# Patient Record
Sex: FEMALE | Race: BLACK OR AFRICAN AMERICAN | Employment: UNEMPLOYED | URBAN - METROPOLITAN AREA
[De-identification: names, ages, dates, MRNs, and addresses within clinical notes are randomized per-mention and may not be internally consistent; named-entity substitution may affect disease eponyms.]

---

## 2018-09-28 ENCOUNTER — HOSPITAL ENCOUNTER (OUTPATIENT)
Dept: LAB | Age: 48
Discharge: HOME | End: 2018-09-28
Payer: OTHER GOVERNMENT

## 2018-09-28 ENCOUNTER — OFFICE VISIT (OUTPATIENT)
Dept: FAMILY MEDICINE CLINIC | Age: 48
End: 2018-09-28

## 2018-09-28 VITALS
HEART RATE: 96 BPM | SYSTOLIC BLOOD PRESSURE: 153 MMHG | TEMPERATURE: 99 F | DIASTOLIC BLOOD PRESSURE: 86 MMHG | HEIGHT: 60 IN | RESPIRATION RATE: 18 BRPM | BODY MASS INDEX: 39.85 KG/M2 | WEIGHT: 203 LBS | OXYGEN SATURATION: 100 %

## 2018-09-28 DIAGNOSIS — R14.2 BELCHING: ICD-10-CM

## 2018-09-28 DIAGNOSIS — R10.13 EPIGASTRIC PAIN: Primary | ICD-10-CM

## 2018-09-28 DIAGNOSIS — R53.81 MALAISE: ICD-10-CM

## 2018-09-28 DIAGNOSIS — R12 HEARTBURN: ICD-10-CM

## 2018-09-28 DIAGNOSIS — R11.0 NAUSEA: ICD-10-CM

## 2018-09-28 DIAGNOSIS — R10.13 EPIGASTRIC PAIN: ICD-10-CM

## 2018-09-28 DIAGNOSIS — R19.7 DIARRHEA, UNSPECIFIED TYPE: ICD-10-CM

## 2018-09-28 DIAGNOSIS — R14.0 ABDOMINAL BLOATING: ICD-10-CM

## 2018-09-28 PROBLEM — E66.01 SEVERE OBESITY (BMI 35.0-39.9): Status: ACTIVE | Noted: 2018-09-28

## 2018-09-28 LAB
ALBUMIN SERPL-MCNC: 3.8 G/DL (ref 3.4–5)
ALBUMIN/GLOB SERPL: 0.9 {RATIO} (ref 0.8–1.7)
ALP SERPL-CCNC: 101 U/L (ref 45–117)
ALT SERPL-CCNC: 22 U/L (ref 13–56)
AMYLASE SERPL-CCNC: 71 U/L (ref 25–115)
ANION GAP SERPL CALC-SCNC: 9 MMOL/L (ref 3–18)
AST SERPL-CCNC: 19 U/L (ref 15–37)
BASOPHILS # BLD: 0 K/UL (ref 0–0.1)
BASOPHILS NFR BLD: 0 % (ref 0–2)
BILIRUB SERPL-MCNC: 0.4 MG/DL (ref 0.2–1)
BUN SERPL-MCNC: 10 MG/DL (ref 7–18)
BUN/CREAT SERPL: 9 (ref 12–20)
CALCIUM SERPL-MCNC: 9.1 MG/DL (ref 8.5–10.1)
CHLORIDE SERPL-SCNC: 105 MMOL/L (ref 100–108)
CO2 SERPL-SCNC: 28 MMOL/L (ref 21–32)
CREAT SERPL-MCNC: 1.15 MG/DL (ref 0.6–1.3)
DIFFERENTIAL METHOD BLD: ABNORMAL
EOSINOPHIL # BLD: 0 K/UL (ref 0–0.4)
EOSINOPHIL NFR BLD: 0 % (ref 0–5)
ERYTHROCYTE [DISTWIDTH] IN BLOOD BY AUTOMATED COUNT: 14 % (ref 11.6–14.5)
GLOBULIN SER CALC-MCNC: 4.1 G/DL (ref 2–4)
GLUCOSE SERPL-MCNC: 102 MG/DL (ref 74–99)
HCT VFR BLD AUTO: 39.6 % (ref 35–45)
HGB BLD-MCNC: 12.1 G/DL (ref 12–16)
LIPASE SERPL-CCNC: 391 U/L (ref 73–393)
LYMPHOCYTES # BLD: 1.6 K/UL (ref 0.9–3.6)
LYMPHOCYTES NFR BLD: 34 % (ref 21–52)
MCH RBC QN AUTO: 25.6 PG (ref 24–34)
MCHC RBC AUTO-ENTMCNC: 30.6 G/DL (ref 31–37)
MCV RBC AUTO: 83.7 FL (ref 74–97)
MONOCYTES # BLD: 0.5 K/UL (ref 0.05–1.2)
MONOCYTES NFR BLD: 10 % (ref 3–10)
NEUTS SEG # BLD: 2.6 K/UL (ref 1.8–8)
NEUTS SEG NFR BLD: 56 % (ref 40–73)
PLATELET # BLD AUTO: 288 K/UL (ref 135–420)
PMV BLD AUTO: 11.4 FL (ref 9.2–11.8)
POTASSIUM SERPL-SCNC: 4.4 MMOL/L (ref 3.5–5.5)
PROT SERPL-MCNC: 7.9 G/DL (ref 6.4–8.2)
RBC # BLD AUTO: 4.73 M/UL (ref 4.2–5.3)
SODIUM SERPL-SCNC: 142 MMOL/L (ref 136–145)
WBC # BLD AUTO: 4.7 K/UL (ref 4.6–13.2)

## 2018-09-28 PROCEDURE — 85025 COMPLETE CBC W/AUTO DIFF WBC: CPT | Performed by: PHYSICIAN ASSISTANT

## 2018-09-28 PROCEDURE — 82150 ASSAY OF AMYLASE: CPT | Performed by: PHYSICIAN ASSISTANT

## 2018-09-28 PROCEDURE — 83013 H PYLORI (C-13) BREATH: CPT | Performed by: PHYSICIAN ASSISTANT

## 2018-09-28 PROCEDURE — 83690 ASSAY OF LIPASE: CPT | Performed by: PHYSICIAN ASSISTANT

## 2018-09-28 PROCEDURE — 80053 COMPREHEN METABOLIC PANEL: CPT | Performed by: PHYSICIAN ASSISTANT

## 2018-09-28 NOTE — PROGRESS NOTES
Zechariah Costa is a 51 y.o. female c/o upper stomach pain with with fatique and chills, had diarrhea for a couple of days.

## 2018-09-28 NOTE — PATIENT INSTRUCTIONS
*Start taking Omeprazole 20mg daily. Indigestion (Dyspepsia or Heartburn): Care Instructions  Your Care Instructions  Sometimes it can be hard to pinpoint the cause of indigestion. (It is also called dyspepsia or heartburn.) Most cases of an upset stomach with bloating, burning, burping, and nausea are minor and go away within several hours. Home treatment and over-the-counter medicine often are able to control symptoms. But if you take medicine to relieve your indigestion without making diet and lifestyle changes, your symptoms are likely to return again and again. If you get indigestion often, it may be a sign of a more serious medical problem. Be sure to follow up with your doctor, who may want to do tests to be sure of the cause of your indigestion. Follow-up care is a key part of your treatment and safety. Be sure to make and go to all appointments, and call your doctor if you are having problems. It's also a good idea to know your test results and keep a list of the medicines you take. How can you care for yourself at home? · Your doctor may recommend over-the-counter medicine. For mild or occasional indigestion, antacids such as Gaviscon, Mylanta, Maalox, or Tums, may help. Be safe with medicines. Be careful when you take over-the-counter antacid medicines. Many of these medicines have aspirin in them. Read the label to make sure that you are not taking more than the recommended dose. Too much aspirin can be harmful. · Your doctor also may recommend over-the-counter acid reducers, such as Pepcid AC, Tagamet HB, Zantac 75, or Prilosec. Read and follow all instructions on the label. If you use these medicines often, talk with your doctor. · Change your eating habits. ¨ It's best to eat several small meals instead of two or three large meals. ¨ After you eat, wait 2 to 3 hours before you lie down. ¨ Chocolate, mint, and alcohol can make GERD worse.   ¨ Spicy foods, foods that have a lot of acid (like tomatoes and oranges), and coffee can make GERD symptoms worse in some people. If your symptoms are worse after you eat a certain food, you may want to stop eating that food to see if your symptoms get better. · Do not smoke or chew tobacco. Smoking can make GERD worse. If you need help quitting, talk to your doctor about stop-smoking programs and medicines. These can increase your chances of quitting for good. · If you have GERD symptoms at night, raise the head of your bed 6 to 8 inches. You can do this by putting the frame on blocks or placing a foam wedge under the head of your mattress. (Adding extra pillows does not work.)  · Do not wear tight clothing around your middle. · Lose weight if you need to. Losing just 5 to 10 pounds can help. · Do not take anti-inflammatory medicines, such as aspirin, ibuprofen (Advil, Motrin), or naproxen (Aleve). These can irritate the stomach. If you need a pain medicine, try acetaminophen (Tylenol), which does not cause stomach upset. When should you call for help? Call your doctor now or seek immediate medical care if:    · You have new or worse belly pain.     · You are vomiting.    Watch closely for changes in your health, and be sure to contact your doctor if:    · You have new or worse symptoms of indigestion.     · You have trouble or pain swallowing.     · You are losing weight.     · You do not get better as expected. Where can you learn more? Go to http://mikey-sarah beth.info/. Enter F814 in the search box to learn more about \"Indigestion (Dyspepsia or Heartburn): Care Instructions. \"  Current as of: May 12, 2017  Content Version: 11.7  © 8948-3920 [employer]. Care instructions adapted under license by [employer] (which disclaims liability or warranty for this information).  If you have questions about a medical condition or this instruction, always ask your healthcare professional. Melinda Dunlap disclaims any warranty or liability for your use of this information. Abdominal Pain: Care Instructions  Your Care Instructions    Abdominal pain has many possible causes. Some aren't serious and get better on their own in a few days. Others need more testing and treatment. If your pain continues or gets worse, you need to be rechecked and may need more tests to find out what is wrong. You may need surgery to correct the problem. Don't ignore new symptoms, such as fever, nausea and vomiting, urination problems, pain that gets worse, and dizziness. These may be signs of a more serious problem. Your doctor may have recommended a follow-up visit in the next 8 to 12 hours. If you are not getting better, you may need more tests or treatment. The doctor has checked you carefully, but problems can develop later. If you notice any problems or new symptoms, get medical treatment right away. Follow-up care is a key part of your treatment and safety. Be sure to make and go to all appointments, and call your doctor if you are having problems. It's also a good idea to know your test results and keep a list of the medicines you take. How can you care for yourself at home? · Rest until you feel better. · To prevent dehydration, drink plenty of fluids, enough so that your urine is light yellow or clear like water. Choose water and other caffeine-free clear liquids until you feel better. If you have kidney, heart, or liver disease and have to limit fluids, talk with your doctor before you increase the amount of fluids you drink. · If your stomach is upset, eat mild foods, such as rice, dry toast or crackers, bananas, and applesauce. Try eating several small meals instead of two or three large ones. · Wait until 48 hours after all symptoms have gone away before you have spicy foods, alcohol, and drinks that contain caffeine. · Do not eat foods that are high in fat.   · Avoid anti-inflammatory medicines such as aspirin, ibuprofen (Advil, Motrin), and naproxen (Aleve). These can cause stomach upset. Talk to your doctor if you take daily aspirin for another health problem. When should you call for help? Call 911 anytime you think you may need emergency care. For example, call if:    · You passed out (lost consciousness).     · You pass maroon or very bloody stools.     · You vomit blood or what looks like coffee grounds.     · You have new, severe belly pain.    Call your doctor now or seek immediate medical care if:    · Your pain gets worse, especially if it becomes focused in one area of your belly.     · You have a new or higher fever.     · Your stools are black and look like tar, or they have streaks of blood.     · You have unexpected vaginal bleeding.     · You have symptoms of a urinary tract infection. These may include:  ¨ Pain when you urinate. ¨ Urinating more often than usual.  ¨ Blood in your urine.     · You are dizzy or lightheaded, or you feel like you may faint.    Watch closely for changes in your health, and be sure to contact your doctor if:    · You are not getting better after 1 day (24 hours). Where can you learn more? Go to http://mikey-sarah beth.info/. Enter M949 in the search box to learn more about \"Abdominal Pain: Care Instructions. \"  Current as of: November 20, 2017  Content Version: 11.7  © 0920-8626 [employer]. Care instructions adapted under license by [employer] (which disclaims liability or warranty for this information). If you have questions about a medical condition or this instruction, always ask your healthcare professional. [address] any warranty or liability for your use of this information.

## 2018-09-28 NOTE — MR AVS SNAPSHOT
[address] [place] [address] [address] [address] 
[phone] Patient: Chen Niño MRN: [id number] :1970 Visit Information Date & Time Provider Department Dept. Phone Encounter #  
 2018 11:30 AM Luanne Rosenberg PA-C [address] [place] [phone] [id number] Follow-up Instructions Return in about 3 weeks (around 10/19/2018). Upcoming Health Maintenance Date Due DTaP/Tdap/Td series (1 - Tdap) 1990 PAP AKA CERVICAL CYTOLOGY 2014 Influenza Age 5 to Adult 2018 Allergies as of 2018  Review Complete On: 2018 By: Luanne Rosenberg PA-C Severity Noted Reaction Type Reactions Vicodin [Hydrocodone-acetaminophen]  10/07/2013    Nausea and Vomiting Current Immunizations  Never Reviewed No immunizations on file. Not reviewed this visit You Were Diagnosed With   
  
 Codes Comments Epigastric pain    -  Primary ICD-10-CM: R10.13 ICD-9-CM: 789.06 Abdominal bloating     ICD-10-CM: R14.0 ICD-9-CM: 787.3 Belching     ICD-10-CM: R14.2 ICD-9-CM: 787.3 Heartburn     ICD-10-CM: R12 
ICD-9-CM: 787.1 Malaise     ICD-10-CM: R53.81 ICD-9-CM: 780.79 Diarrhea, unspecified type     ICD-10-CM: R19.7 ICD-9-CM: 787.91 Nausea     ICD-10-CM: R11.0 ICD-9-CM: 787.02 Vitals BP Pulse Temp Resp Height(growth percentile) Weight(growth percentile) 153/86 (BP 1 Location: Left arm, BP Patient Position: Sitting) 96 99 °F (37.2 °C) (Oral) 18 5' (1.524 m) 203 lb (92.1 kg) SpO2 BMI OB Status Smoking Status 100% 39.65 kg/m2 Hysterectomy Never Smoker Vitals History BMI and BSA Data Body Mass Index Body Surface Area  
 39.65 kg/m 2 1.97 m 2 Preferred Pharmacy Pharmacy Name Phone [address] [place] [phone] Your Updated Medication List  
  
   
This list is accurate as of 9/28/18 12:13 PM.  Always use your most recent med list.  
  
  
  
  
 MICARDIS 40 mg tablet Generic drug:  telmisartan Take 40 mg by mouth daily. Follow-up Instructions Return in about 3 weeks (around 10/19/2018). To-Do List   
 09/28/2018 Lab:  AMYLASE   
  
 09/28/2018 Lab:  CBC WITH AUTOMATED DIFF   
  
 09/28/2018 Lab:  H. PYLORI BREATH TEST   
  
 09/28/2018 Lab:  LIPASE   
  
 09/28/2018 Lab:  METABOLIC PANEL, COMPREHENSIVE   
  
 09/28/2018 Imaging:  US ABD COMP Patient Instructions *Start taking Omeprazole 20mg daily. Indigestion (Dyspepsia or Heartburn): Care Instructions Your Care Instructions Sometimes it can be hard to pinpoint the cause of indigestion. (It is also called dyspepsia or heartburn.) Most cases of an upset stomach with bloating, burning, burping, and nausea are minor and go away within several hours. Home treatment and over-the-counter medicine often are able to control symptoms. But if you take medicine to relieve your indigestion without making diet and lifestyle changes, your symptoms are likely to return again and again. If you get indigestion often, it may be a sign of a more serious medical problem. Be sure to follow up with your doctor, who may want to do tests to be sure of the cause of your indigestion. Follow-up care is a key part of your treatment and safety. Be sure to make and go to all appointments, and call your doctor if you are having problems. It's also a good idea to know your test results and keep a list of the medicines you take. How can you care for yourself at home? · Your doctor may recommend over-the-counter medicine. For mild or occasional indigestion, antacids such as Gaviscon, Mylanta, Maalox, or Tums, may help. Be safe with medicines.  Be careful when you take over-the-counter antacid medicines. Many of these medicines have aspirin in them. Read the label to make sure that you are not taking more than the recommended dose. Too much aspirin can be harmful. · Your doctor also may recommend over-the-counter acid reducers, such as Pepcid AC, Tagamet HB, Zantac 75, or Prilosec. Read and follow all instructions on the label. If you use these medicines often, talk with your doctor. · Change your eating habits. ¨ It's best to eat several small meals instead of two or three large meals. ¨ After you eat, wait 2 to 3 hours before you lie down. ¨ Chocolate, mint, and alcohol can make GERD worse. ¨ Spicy foods, foods that have a lot of acid (like tomatoes and oranges), and coffee can make GERD symptoms worse in some people. If your symptoms are worse after you eat a certain food, you may want to stop eating that food to see if your symptoms get better. · Do not smoke or chew tobacco. Smoking can make GERD worse. If you need help quitting, talk to your doctor about stop-smoking programs and medicines. These can increase your chances of quitting for good. · If you have GERD symptoms at night, raise the head of your bed 6 to 8 inches. You can do this by putting the frame on blocks or placing a foam wedge under the head of your mattress. (Adding extra pillows does not work.) · Do not wear tight clothing around your middle. · Lose weight if you need to. Losing just 5 to 10 pounds can help. · Do not take anti-inflammatory medicines, such as aspirin, ibuprofen (Advil, Motrin), or naproxen (Aleve). These can irritate the stomach. If you need a pain medicine, try acetaminophen (Tylenol), which does not cause stomach upset. When should you call for help? Call your doctor now or seek immediate medical care if: 
  · You have new or worse belly pain.  
  · You are vomiting.  
 Watch closely for changes in your health, and be sure to contact your doctor if:   · You have new or worse symptoms of indigestion.  
  · You have trouble or pain swallowing.  
  · You are losing weight.  
  · You do not get better as expected. Where can you learn more? Go to http://mikey-sarah beth.info/. Enter O442 in the search box to learn more about \"Indigestion (Dyspepsia or Heartburn): Care Instructions. \" Current as of: May 12, 2017 Content Version: 11.7 © 3949-1603 [employer]. Care instructions adapted under license by [employer] (which disclaims liability or warranty for this information). If you have questions about a medical condition or this instruction, always ask your healthcare professional. [address] any warranty or liability for your use of this information. Abdominal Pain: Care Instructions Your Care Instructions Abdominal pain has many possible causes. Some aren't serious and get better on their own in a few days. Others need more testing and treatment. If your pain continues or gets worse, you need to be rechecked and may need more tests to find out what is wrong. You may need surgery to correct the problem. Don't ignore new symptoms, such as fever, nausea and vomiting, urination problems, pain that gets worse, and dizziness. These may be signs of a more serious problem. Your doctor may have recommended a follow-up visit in the next 8 to 12 hours. If you are not getting better, you may need more tests or treatment. The doctor has checked you carefully, but problems can develop later. If you notice any problems or new symptoms, get medical treatment right away. Follow-up care is a key part of your treatment and safety. Be sure to make and go to all appointments, and call your doctor if you are having problems. It's also a good idea to know your test results and keep a list of the medicines you take. How can you care for yourself at home? · Rest until you feel better. · To prevent dehydration, drink plenty of fluids, enough so that your urine is light yellow or clear like water. Choose water and other caffeine-free clear liquids until you feel better. If you have kidney, heart, or liver disease and have to limit fluids, talk with your doctor before you increase the amount of fluids you drink. · If your stomach is upset, eat mild foods, such as rice, dry toast or crackers, bananas, and applesauce. Try eating several small meals instead of two or three large ones. · Wait until 48 hours after all symptoms have gone away before you have spicy foods, alcohol, and drinks that contain caffeine. · Do not eat foods that are high in fat. · Avoid anti-inflammatory medicines such as aspirin, ibuprofen (Advil, Motrin), and naproxen (Aleve). These can cause stomach upset. Talk to your doctor if you take daily aspirin for another health problem. When should you call for help? Call 911 anytime you think you may need emergency care. For example, call if: 
  · You passed out (lost consciousness).  
  · You pass maroon or very bloody stools.  
  · You vomit blood or what looks like coffee grounds.  
  · You have new, severe belly pain.  
 Call your doctor now or seek immediate medical care if: 
  · Your pain gets worse, especially if it becomes focused in one area of your belly.  
  · You have a new or higher fever.  
  · Your stools are black and look like tar, or they have streaks of blood.  
  · You have unexpected vaginal bleeding.  
  · You have symptoms of a urinary tract infection. These may include: 
¨ Pain when you urinate. ¨ Urinating more often than usual. 
¨ Blood in your urine.  
  · You are dizzy or lightheaded, or you feel like you may faint.  
 Watch closely for changes in your health, and be sure to contact your doctor if: 
  · You are not getting better after 1 day (24 hours). Where can you learn more? Go to http://mikey-sarah beth.info/. Enter M815 in the search box to learn more about \"Abdominal Pain: Care Instructions. \" Current as of: November 20, 2017 Content Version: 11.7 © 7310-8903 [employer]. Care instructions adapted under license by [employer] (which disclaims liability or warranty for this information). If you have questions about a medical condition or this instruction, always ask your healthcare professional. [address] any warranty or liability for your use of this information. Introducing [facility]! Laura Stanley introduces [employer] patient portal. Now you can access parts of your medical record, email your doctor's office, and request medication refills online. 1. In your internet browser, go to [email or web address]. [email or web address] 2. Click on the First Time User? Click Here link in the Sign In box. You will see the New Member Sign Up page. 3. Enter your [employer] Access Code exactly as it appears below. You will not need to use this code after youve completed the sign-up process. If you do not sign up before the expiration date, you must request a new code. · [employer] Access Code: [email or web address] Expires: 12/27/2018 11:55 AM 
 
4. Enter the last four digits of your Social Security Number (xxxx) and Date of Birth (mm/dd/yyyy) as indicated and click Submit. You will be taken to the next sign-up page. 5. Create a [employer] ID. This will be your [employer] login ID and cannot be changed, so think of one that is secure and easy to remember. 6. Create a [employer] password. You can change your password at any time. 7. Enter your Password Reset Question and Answer. This can be used at a later time if you forget your password. 8. Enter your e-mail address. You will receive e-mail notification when new information is available in [address] 9. Click Sign Up. You can now view and download portions of your medical record. 10. Click the Download Summary menu link to download a portable copy of your medical information. If you have questions, please visit the Frequently Asked Questions section of the [employer] website. Remember, [employer] is NOT to be used for urgent needs. For medical emergencies, dial 911. Now available from your iPhone and Android! Please provide this summary of care documentation to your next provider. Your primary care clinician is listed as Christopher Damian. If you have any questions after today's visit, please call [phone].

## 2018-09-28 NOTE — PROGRESS NOTES
HISTORY OF PRESENT ILLNESS  Joanne Michaels is a 51 y.o. female. HPI  Joanne Michaels is a 51 y.o. female who presents to the office today for abdominal pain. She is a new patient here. She has a hx of HTN. Her PCP is in [place]. She comes in today with c/o abdominal pain that started last Friday. It is epigastric pain, with bloating and increased belching. She has noticed more acid reflux lately and a \"bad\" taste in her mouth. On Sunday she developed myalgia and fatigue. On Tuesday and Wednesday she had a fever, up to 101. She also had a few days of diarrhea but that resolved. Her mouth is watery with nausea but no vomiting. There is loss of appetite. She denies blood in stool. No recurrent fever, UTI symptoms, low back pain, dizziness, palpitations, CP, presyncope or syncope. Chief Complaint   Patient presents with    Abdominal Pain       Current Outpatient Prescriptions on File Prior to Visit   Medication Sig Dispense Refill    telmisartan (MICARDIS) 40 mg tablet Take 40 mg by mouth daily. No current facility-administered medications on file prior to visit. Allergies   Allergen Reactions    Vicodin [Hydrocodone-Acetaminophen] Nausea and Vomiting     Past Medical History:   Diagnosis Date    Chest pain, unspecified 11/22/2013    resolved     Essential hypertension     Essential hypertension, benign 11/22/2013    resolved     Gout     Hypertension     Palpitations 11/22/2013    resolved     Shortness of breath 11/22/2013    resolved      History   Smoking Status    Never Smoker   Smokeless Tobacco    Never Used     History   Alcohol Use No     Family History   Problem Relation Age of Onset    Heart Attack Maternal Grandmother     Heart Attack Maternal Grandfather      Review of Systems   Constitutional: Positive for chills, fever and malaise/fatigue. Negative for diaphoresis and weight loss. Respiratory: Negative for cough. Cardiovascular: Negative for chest pain. Gastrointestinal: Positive for abdominal pain, diarrhea, heartburn and nausea. Negative for blood in stool, constipation, melena and vomiting. Genitourinary: Negative for dysuria, flank pain and hematuria. Musculoskeletal: Positive for myalgias. Negative for back pain. Neurological: Negative for dizziness and headaches. Visit Vitals    /86 (BP 1 Location: Left arm, BP Patient Position: Sitting)    Pulse 96    Temp 99 °F (37.2 °C) (Oral)    Resp 18    Ht 5' (1.524 m)    Wt 203 lb (92.1 kg)    SpO2 100%    BMI 39.65 kg/m2     Physical Exam   Constitutional: She is oriented to person, place, and time. She appears well-developed and well-nourished. No distress. Cardiovascular: Normal rate, regular rhythm and normal heart sounds. Pulmonary/Chest: Effort normal and breath sounds normal. No respiratory distress. She has no wheezes. She has no rales. Abdominal: Soft. Normal appearance and bowel sounds are normal. She exhibits no distension, no ascites and no mass. There is no hepatosplenomegaly. There is tenderness in the epigastric area. There is no guarding and no CVA tenderness. Neurological: She is alert and oriented to person, place, and time. Skin: Skin is warm and dry. Psychiatric: She has a normal mood and affect. Her behavior is normal. Thought content normal.   Nursing note and vitals reviewed. ASSESSMENT and PLAN    ICD-10-CM ICD-9-CM    1. Epigastric pain R10.13 789.06 US ABD COMP      H. PYLORI BREATH TEST      CBC WITH AUTOMATED DIFF      METABOLIC PANEL, COMPREHENSIVE      LIPASE      AMYLASE      CANCELED: AMB POC HELICOBACTER PYLORI   2. Abdominal bloating R14.0 787.3 US ABD COMP      H. PYLORI BREATH TEST      CBC WITH AUTOMATED DIFF      METABOLIC PANEL, COMPREHENSIVE      LIPASE      AMYLASE      CANCELED: AMB POC HELICOBACTER PYLORI   3. Belching R14.2 787.3 US ABD COMP      H. PYLORI BREATH TEST      CANCELED: AMB POC HELICOBACTER PYLORI   4.  Heartburn R12 787.1 H. PYLORI BREATH TEST      CBC WITH AUTOMATED DIFF      METABOLIC PANEL, COMPREHENSIVE      LIPASE      AMYLASE   5. Malaise R53.81 780.79    6. Diarrhea, unspecified type R19.7 787.91    7. Nausea R11.0 787.02 US ABD COMP      H. PYLORI BREATH TEST      CBC WITH AUTOMATED DIFF      METABOLIC PANEL, COMPREHENSIVE      LIPASE      AMYLASE      CANCELED: AMB POC HELICOBACTER PYLORI      Will check abdominal US, H pylori breath test, labs. If symptoms worsen over the weekend, encouraged to go to the ED or urgent care. Start omeprazole otc. Patient agrees with the plan and verbalizes understanding. Follow-up Disposition:  Return in about 3 weeks (around 10/19/2018).     Oliverio Monteiro PA-C  9/28/2018

## 2018-10-01 LAB — UREA BREATH TEST QL: NEGATIVE

## 2018-10-02 NOTE — PROGRESS NOTES
H Pylori negative. CBC normal. Amylase and lipase normal range. Can you please forward these results to her PCP and have her follow up with him?

## 2018-10-31 ENCOUNTER — TELEPHONE (OUTPATIENT)
Dept: FAMILY MEDICINE CLINIC | Age: 48
End: 2018-10-31

## 2018-11-07 ENCOUNTER — OFFICE VISIT (OUTPATIENT)
Dept: FAMILY MEDICINE CLINIC | Age: 48
End: 2018-11-07

## 2018-11-07 VITALS
WEIGHT: 207 LBS | BODY MASS INDEX: 40.64 KG/M2 | HEART RATE: 98 BPM | RESPIRATION RATE: 18 BRPM | DIASTOLIC BLOOD PRESSURE: 97 MMHG | SYSTOLIC BLOOD PRESSURE: 155 MMHG | OXYGEN SATURATION: 100 % | TEMPERATURE: 98.5 F | HEIGHT: 60 IN

## 2018-11-07 DIAGNOSIS — M25.511 RIGHT SHOULDER PAIN, UNSPECIFIED CHRONICITY: Primary | ICD-10-CM

## 2018-11-07 DIAGNOSIS — Z23 ENCOUNTER FOR IMMUNIZATION: ICD-10-CM

## 2018-11-07 NOTE — PROGRESS NOTES
Micahvickimary Lopez is a 51 y.o. female c/o R arm pain starting in shoulder and swelling in hand, this has been going on for about one month, no injury

## 2018-11-19 ENCOUNTER — TELEPHONE (OUTPATIENT)
Dept: FAMILY MEDICINE CLINIC | Age: 48
End: 2018-11-19

## 2018-11-19 NOTE — TELEPHONE ENCOUNTER
Patient would like to know if an MRI can be ordered for her due to her still having swelling and shoulder pain. Patient would also like to see if she can be referred out to a specialist to have a second opinion.        Please advise

## 2018-11-20 DIAGNOSIS — G89.29 CHRONIC RIGHT SHOULDER PAIN: Primary | ICD-10-CM

## 2018-11-20 DIAGNOSIS — M25.511 CHRONIC RIGHT SHOULDER PAIN: Primary | ICD-10-CM

## 2018-12-07 ENCOUNTER — OFFICE VISIT (OUTPATIENT)
Dept: ORTHOPEDIC SURGERY | Facility: CLINIC | Age: 48
End: 2018-12-07

## 2018-12-07 VITALS
DIASTOLIC BLOOD PRESSURE: 82 MMHG | HEART RATE: 89 BPM | SYSTOLIC BLOOD PRESSURE: 170 MMHG | RESPIRATION RATE: 16 BRPM | TEMPERATURE: 98.1 F | OXYGEN SATURATION: 100 % | HEIGHT: 60 IN | BODY MASS INDEX: 40.84 KG/M2 | WEIGHT: 208 LBS

## 2018-12-07 DIAGNOSIS — R14.0 ABDOMINAL BLOATING: ICD-10-CM

## 2018-12-07 DIAGNOSIS — G89.29 CHRONIC RIGHT SHOULDER PAIN: ICD-10-CM

## 2018-12-07 DIAGNOSIS — R10.13 EPIGASTRIC PAIN: ICD-10-CM

## 2018-12-07 DIAGNOSIS — M25.511 CHRONIC RIGHT SHOULDER PAIN: ICD-10-CM

## 2018-12-07 DIAGNOSIS — R11.0 NAUSEA: ICD-10-CM

## 2018-12-07 DIAGNOSIS — R14.2 BELCHING: ICD-10-CM

## 2018-12-07 DIAGNOSIS — M75.51 ACUTE BURSITIS OF RIGHT SHOULDER: Primary | ICD-10-CM

## 2018-12-07 RX ORDER — BETAMETHASONE SODIUM PHOSPHATE AND BETAMETHASONE ACETATE 3; 3 MG/ML; MG/ML
6 INJECTION, SUSPENSION INTRA-ARTICULAR; INTRALESIONAL; INTRAMUSCULAR; SOFT TISSUE ONCE
Qty: 0.5 ML | Refills: 0
Start: 2018-12-07 | End: 2018-12-07

## 2018-12-07 RX ORDER — BUPIVACAINE HYDROCHLORIDE 2.5 MG/ML
4 INJECTION, SOLUTION EPIDURAL; INFILTRATION; INTRACAUDAL ONCE
Qty: 4 ML | Refills: 0
Start: 2018-12-07 | End: 2018-12-07

## 2018-12-07 NOTE — PROGRESS NOTES
Patient: Zion Stephenson                MRN: [id number]       SSN: [id number] YOB: 1970        AGE: 51 y.o. SEX: female PCP: Roosevelt Jones PA-C 
12/07/18 Chief Complaint Patient presents with  Shoulder Pain Rt shoulder pain HISTORY:  Zion Stephenson is a 51 y.o. female who is seen for right shoulder pain. She has been experiencing continued shoulder pain for the past 2 months. She thinks she injured her shoulder doing lat pull-downs at the gym. She started an exercise program a couple of months ago to lose weight but she has not been able to work out anymore since her injury. She notes shoulder pain with overhead activities and at night. She is right handed. She reports difficulty with everyday activities. She notices mild difficulty with overhead activities. She reports her pain is significantly impacting her sleep at night. Pain Assessment  12/7/2018 Location of Pain Shoulder Location Modifiers Right Severity of Pain 3 Quality of Pain Dull;Aching Duration of Pain Persistent Frequency of Pain Constant Aggravating Factors Bending; Other (Comment) Aggravating Factors Comment Raising arm upward, laying on that side Limiting Behavior Yes Relieving Factors Rest  
Result of Injury No  
 
Occupation, etc:  Ms. 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 She had stopped working after injuring her right knee in a work related injury. She lives in [place] with her  and 14yo daughter. She has another [address] yo daughter studying physical therapy. Current weight is 208 pounds. She is 5' tall. No results found for: HBA1C, HGBE8, PTP1SNOQ, RIK3XRRB, QSO2XDWZ Weight Metrics 12/7/2018 11/7/2018 9/28/2018 6/9/2014 1/21/2014 11/22/2013 10/18/2013 Weight 208 lb 207 lb 203 lb 197 lb 6.4 oz 194 lb 196 lb 198 lb BMI 40.62 kg/m2 40.43 kg/m2 39.65 kg/m2 38.55 kg/m2 37.89 kg/m2 38.28 kg/m2 38.67 kg/m2 Patient Active Problem List  
Diagnosis Code  Palpitations R00.2  Chest pain, unspecified R07.9  Essential hypertension, benign I10  Shortness of breath R06.02  Severe obesity (BMI 35.0-39. 9) E66.01  
 
REVIEW OF SYSTEMS: All Below are Negative except: See HPI Constitutional: negative for fever, chills, and weight loss. Cardiovascular: negative for chest pain, claudication, leg swelling, SOB, [place] Gastrointestinal: Negative for pain, N/V/C/D, Blood in stool or urine, dysuria,  hematuria, incontinence, pelvic pain. Musculoskeletal: See HPI Neurological: Negative for dizziness and weakness. Negative for headaches, Visual changes, confusion, seizures Phychiatric/Behavioral: Negative for depression, memory loss, substance  abuse. Extremities: Negative for hair changes, rash, or skin lesion changes. Hematologic: Negative for bleeding problems, bruising, pallor or swollen lymph  nodes Peripheral Vascular: No calf pain, no circulation deficits. Social History Socioeconomic History  Marital status:  Spouse name: Not on file  Number of children: Not on file  Years of education: Not on file  Highest education level: Not on file Social Needs  Financial resource strain: Not on file  Food insecurity - worry: Not on file  Food insecurity - inability: Not on file  Transportation needs - medical: Not on file  Transportation needs - non-medical: Not on file Occupational History  Not on file Tobacco Use  Smoking status: Never Smoker  Smokeless tobacco: Never Used Substance and Sexual Activity  Alcohol use: No  
 Drug use: Not on file  Sexual activity: Yes Other Topics Concern  Not on file Social History Narrative  Not on file Allergies Allergen Reactions  Vicodin [Hydrocodone-Acetaminophen] Nausea and Vomiting Current Outpatient Medications Medication Sig  
  telmisartan (MICARDIS) 40 mg tablet Take 40 mg by mouth daily. No current facility-administered medications for this visit. PHYSICAL EXAMINATION: 
Visit Vitals /82 (BP 1 Location: Left arm, BP Patient Position: Sitting) Pulse 89 Temp 98.1 °F (36.7 °C) (Oral) Resp 16 Ht 5' (1.524 m) Wt 208 lb (94.3 kg) SpO2 100% BMI 40.62 kg/m² ORTHO EXAMINATION: 
Examination Right shoulder Skin Intact Effusion - Biceps deformity - Atrophy -  
AC joint tenderness - Acromial tenderness + Biceps tenderness - Forward flexion/Elevation  with pain Active abduction  External rotation ROM 60 Internal rotation ROM 80 Apprehension - Impingement + Drop Arm Test -  
Neurovascular Intact PROCEDURE:  After discussing treatment options, patient's right shoulder was injected with 4 cc Marcaine and 1/2 cc Celestone. Chart reviewed for the following: 
 Ilsa Simmons MD, have reviewed the History, Physical and updated the Allergic reactions for Killian Seal TIME OUT performed immediately prior to start of procedure: 
Ilsa Simmons MD, have performed the following reviews on Killian Seal prior to the start of the procedure: 
         
* Patient was identified by name and date of birth * Agreement on procedure being performed was verified * Risks and Benefits explained to the patient * Procedure site verified and marked as necessary * Patient was positioned for comfort * Consent was obtained Time: 10:38 AM  
 
Date of procedure: 12/7/2018 Procedure performed by:  Rebeka Mcintosh MD 
 
Ms. Frye tolerated the procedure well with no complications. RADIOGRAPHS: 
XR RT SHOULDER 11/7/18 [facility] IMPRESSION: 
1. No acute fracture-dislocation. 
-I have independently reviewed these images during this office visit. -Dr. Maral Barcenas IMPRESSION:  Three views - No fractures, no acromioclavicular narrowing, no glenohumeral narrowing, no calcific densities. IMPRESSION:   
  ICD-10-CM ICD-9-CM 1. Acute bursitis of right shoulder M75.51 726.10 betamethasone (CELESTONE SOLUSPAN) 6 mg/mL injection BETAMETHASONE ACETATE & SODIUM PHOSPHATE INJECTION 3 MG EA.  
   DRAIN/INJECT LARGE JOINT/BURSA  
   bupivacaine, PF, (MARCAINE, PF,) 0.25 % (2.5 mg/mL) injection 2. Chronic right shoulder pain M25.511 719.41 betamethasone (CELESTONE SOLUSPAN) 6 mg/mL injection G89.29 338.29 BETAMETHASONE ACETATE & SODIUM PHOSPHATE INJECTION 3 MG EA.  
   DRAIN/INJECT LARGE JOINT/BURSA  
   bupivacaine, PF, (MARCAINE, PF,) 0.25 % (2.5 mg/mL) injection PLAN:  After discussing treatment options, patient's right shoulder was injected with 4 cc Marcaine and 1/2 cc Celestone. She will follow up as needed. We discussed possible need for physical therapy or MR arthrogram at some time in the future if pain continues.   
 
Scribed by Brigida Angelucci (Martha Rainey) as dictated by Dedrick Navarrete MD

## 2018-12-07 NOTE — PATIENT INSTRUCTIONS
Shoulder Bursitis: Exercises Your Care Instructions Here are some examples of typical rehabilitation exercises for your condition. Start each exercise slowly. Ease off the exercise if you start to have pain. Your doctor or physical therapist will tell you when you can start these exercises and which ones will work best for you. How to do the exercises Posterior stretching exercise 1. Hold the elbow of your injured arm with your other hand. 2. Use your hand to pull your injured arm gently up and across your body. You will feel a gentle stretch across the back of your injured shoulder. 3. Hold for at least 15 to 30 seconds. Then slowly lower your arm. 4. Repeat 2 to 4 times. Up-the-back stretch 1. Put your hand in your back pocket. Let it rest there to stretch your shoulder. 2. With your other hand, hold your injured arm (palm outward) behind your back by the wrist. Pull your arm up gently to stretch your shoulder. 3. Next, put a towel over your other shoulder. Put the hand of your injured arm behind your back. Now hold the back end of the towel. With the other hand, hold the front end of the towel in front of your body. Pull gently on the front end of the towel. This will bring your hand farther up your back to stretch your shoulder. Overhead stretch 1. Standing about an arm's length away, grasp onto a solid surface. You could use a countertop, a doorknob, or the back of a sturdy chair. 2. With your knees slightly bent, bend forward with your arms straight. Lower your upper body, and let your shoulders stretch. 3. As your shoulders are able to stretch farther, you may need to take a step or two backward. 4. Hold for at least 15 to 30 seconds. Then stand up and relax. If you had stepped back during your stretch, step forward so you can keep your hands on the solid surface. 5. Repeat 2 to 4 times. Shoulder flexion (lying down) 1. Lie on your back, holding a wand with both hands. Your palms should face down as you hold the wand. 2. Keeping your elbows straight, slowly raise your arms over your head. Raise them until you feel a stretch in your shoulders, upper back, and chest. 
3. Hold for 15 to 30 seconds. 4. Repeat 2 to 4 times. Shoulder rotation (lying down) 1. Lie on your back. Hold a wand with both hands with your elbows bent and palms up. 2. Keep your elbows close to your body, and move the wand across your body toward the sore arm. 3. Hold for 8 to 12 seconds. 4. Repeat 2 to 4 times. Shoulder blade squeeze 1. Stand with your arms at your sides, and squeeze your shoulder blades together. Do not raise your shoulders up as you squeeze. 2. Hold 6 seconds. 3. Repeat 8 to 12 times. Shoulder flexor and extensor exercise 1. Push forward (flex): Stand facing a wall or doorjamb, about 6 inches or less back. Hold your injured arm against your body. Make a closed fist with your thumb on top. Then gently push your hand forward into the wall with about 25% to 50% of your strength. Don't let your body move backward as you push. Hold for about 6 seconds. Relax for a few seconds. Repeat 8 to 12 times. 2. Push backward (extend): Stand with your back flat against a wall. Your upper arm should be against the wall, with your elbow bent 90 degrees (your hand straight ahead). Push your elbow gently back against the wall with about 25% to 50% of your strength. Don't let your body move forward as you push. Hold for about 6 seconds. Relax for a few seconds. Repeat 8 to 12 times. Scapular exercise: Wall push-ups 1. Stand facing a wall, about 12 inches to 18 inches away. 2. Place your hands on the wall at shoulder height. 3. Slowly bend your elbows and bring your face to the wall. Keep your back and hips straight. 4. Push back to where you started. 5. Repeat 8 to 12 times. 6. When you can do this exercise against a wall comfortably, you can try it against a counter. You can then slowly progress to the end of a couch, then to a sturdy chair, and finally to the floor. Scapular exercise: Retraction 1. Put the band around a solid object at about waist level. (A bedpost will work well.) Each hand should hold an end of the band. 2. With your elbows at your sides and bent to 90 degrees, pull the band back. Your shoulder blades should move toward each other. Then move your arms back where you started. 3. Repeat 8 to 12 times. 4. If you have good range of motion in your shoulders, try this exercise with your arms lifted out to the sides. Keep your elbows at a 90-degree angle. Raise the elastic band up to about shoulder level. Pull the band back to move your shoulder blades toward each other. Then move your arms back where you started. Internal rotator strengthening exercise 1. Start by tying a piece of elastic exercise material to a doorknob. You can use surgical tubing or Thera-Band. 2. Stand or sit with your shoulder relaxed and your elbow bent 90 degrees. Your upper arm should rest comfortably against your side. Squeeze a rolled towel between your elbow and your body for comfort. This will help keep your arm at your side. 3. Hold one end of the elastic band in the hand of the painful arm. 4. Slowly rotate your forearm toward your body until it touches your belly. Slowly move it back to where you started. 5. Keep your elbow and upper arm firmly tucked against the towel roll or at your side. 6. Repeat 8 to 12 times. External rotator strengthening exercise 1. Start by tying a piece of elastic exercise material to a doorknob. You can use surgical tubing or Thera-Band. (You may also hold one end of the band in each hand.) 2. Stand or sit with your shoulder relaxed and your elbow bent 90 degrees. Your upper arm should rest comfortably against your side.  Squeeze a rolled towel between your elbow and your body for comfort. This will help keep your arm at your side. 3. Hold one end of the elastic band with the hand of the painful arm. 4. Start with your forearm across your belly. Slowly rotate the forearm out away from your body. Keep your elbow and upper arm tucked against the towel roll or the side of your body until you begin to feel tightness in your shoulder. Slowly move your arm back to where you started. 5. Repeat 8 to 12 times. Follow-up care is a key part of your treatment and safety. Be sure to make and go to all appointments, and call your doctor if you are having problems. It's also a good idea to know your test results and keep a list of the medicines you take. Where can you learn more? Go to http://mikey-sarah beth.info/. Enter H442 in the search box to learn more about \"Shoulder Bursitis: Exercises. \" Current as of: November 29, 2017 Content Version: 11.8 © 3706-2792 [employer]. Care instructions adapted under license by [employer] (which disclaims liability or warranty for this information). If you have questions about a medical condition or this instruction, always ask your healthcare professional. [address] any warranty or liability for your use of this information. Shoulder Stretches: Exercises Your Care Instructions Here are some examples of exercises for your shoulder. Start each exercise slowly. Ease off the exercise if you start to have pain. Your doctor or physical therapist will tell you when you can start these exercises and which ones will work best for you. How to do the exercises Shoulder stretch 1.  a doorway and place one arm against the door frame. Your elbow should be a little higher than your shoulder. 2. Relax your shoulders as you lean forward, allowing your chest and shoulder muscles to stretch.  You can also turn your body slightly away from your arm to stretch the muscles even more. 3. Hold for 15 to 30 seconds. 4. Repeat 2 to 4 times with each arm. Shoulder and chest stretch 1. Shoulder and chest stretch 2. While sitting, relax your upper body so you slump slightly in your chair. 3. As you breathe in, straighten your back and open your arms out to the sides. 4. Gently pull your shoulder blades back and downward. 5. Hold for 15 to 30 seconds as your breathe normally. 6. Repeat 2 to 4 times. Overhead stretch 1. Reach up over your head with both arms. 2. Hold for 15 to 30 seconds. 3. Repeat 2 to 4 times. Follow-up care is a key part of your treatment and safety. Be sure to make and go to all appointments, and call your doctor if you are having problems. It's also a good idea to know your test results and keep a list of the medicines you take. Where can you learn more? Go to http://mikey-sarah beth.info/. Enter S254 in the search box to learn more about \"Shoulder Stretches: Exercises. \" Current as of: November 29, 2017 Content Version: 11.8 © 9413-9676 [employer]. Care instructions adapted under license by [employer] (which disclaims liability or warranty for this information). If you have questions about a medical condition or this instruction, always ask your healthcare professional. [address] any warranty or liability for your use of this information.

## 2019-06-04 ENCOUNTER — OFFICE VISIT (OUTPATIENT)
Dept: FAMILY MEDICINE CLINIC | Age: 49
End: 2019-06-04

## 2019-06-04 VITALS
HEIGHT: 60 IN | OXYGEN SATURATION: 98 % | RESPIRATION RATE: 18 BRPM | SYSTOLIC BLOOD PRESSURE: 160 MMHG | DIASTOLIC BLOOD PRESSURE: 108 MMHG | WEIGHT: 211 LBS | HEART RATE: 99 BPM | TEMPERATURE: 98.8 F | BODY MASS INDEX: 41.43 KG/M2

## 2019-06-04 DIAGNOSIS — G89.29 CHRONIC LEFT-SIDED LOW BACK PAIN WITHOUT SCIATICA: ICD-10-CM

## 2019-06-04 DIAGNOSIS — E66.01 CLASS 3 SEVERE OBESITY WITH SERIOUS COMORBIDITY AND BODY MASS INDEX (BMI) OF 40.0 TO 44.9 IN ADULT, UNSPECIFIED OBESITY TYPE (HCC): ICD-10-CM

## 2019-06-04 DIAGNOSIS — I10 ESSENTIAL HYPERTENSION, BENIGN: Primary | ICD-10-CM

## 2019-06-04 DIAGNOSIS — M54.50 CHRONIC LEFT-SIDED LOW BACK PAIN WITHOUT SCIATICA: ICD-10-CM

## 2019-06-04 RX ORDER — METHOCARBAMOL 500 MG/1
500 TABLET, FILM COATED ORAL 3 TIMES DAILY
Qty: 30 TAB | Refills: 0 | Status: SHIPPED | OUTPATIENT
Start: 2019-06-04 | End: 2019-09-17

## 2019-06-04 RX ORDER — TELMISARTAN AND HYDROCHLORTHIAZIDE 40; 12.5 MG/1; MG/1
1 TABLET ORAL DAILY
Qty: 30 TAB | Refills: 2 | Status: SHIPPED | OUTPATIENT
Start: 2019-06-04 | End: 2019-08-06 | Stop reason: SDUPTHER

## 2019-06-04 NOTE — PROGRESS NOTES
HISTORY OF PRESENT ILLNESS  Rena Kasper is a 51 y.o. female. HPI  Rena Kasper is a 51 y.o. female who presents to the office today for hip pain  She comes in for left sided low back pain. This has been going on for a few months. It is worse when she wakes up in the morning and better when she is up and moving around. On Friday the pain would not resolve with movement and stretching. She applied an otc pain patch and this helped. She denies injury. HTN- She is taking micardis, sounds like she skips doses. Her BP has been consistently elevated at our visits. She is not eating well and does not follow low sodium diet. She has gained weight and knows she needs to work on weight loss. Chief Complaint   Patient presents with    Hip Pain       Current Outpatient Medications on File Prior to Visit   Medication Sig Dispense Refill    telmisartan (MICARDIS) 40 mg tablet Take 40 mg by mouth daily. No current facility-administered medications on file prior to visit. Allergies   Allergen Reactions    Vicodin [Hydrocodone-Acetaminophen] Nausea and Vomiting     Past Medical History:   Diagnosis Date    Chest pain, unspecified 11/22/2013    resolved     Essential hypertension     Essential hypertension, benign 11/22/2013    resolved     Gout     Hypertension     Palpitations 11/22/2013    resolved     Shortness of breath 11/22/2013    resolved      Social History     Tobacco Use   Smoking Status Never Smoker   Smokeless Tobacco Never Used     Social History     Substance and Sexual Activity   Alcohol Use No     Family History   Problem Relation Age of Onset    Heart Attack Maternal Grandmother     Heart Attack Maternal Grandfather      Review of Systems   Constitutional: Negative for malaise/fatigue and weight loss. Eyes: Negative for blurred vision. Respiratory: Negative for shortness of breath. Cardiovascular: Negative for chest pain, palpitations and leg swelling. Musculoskeletal: Positive for back pain. Negative for falls. Neurological: Negative for dizziness, tingling, focal weakness and headaches. Endo/Heme/Allergies: Does not bruise/bleed easily. Psychiatric/Behavioral: The patient is not nervous/anxious. Visit Vitals  BP (!) 160/108 (BP 1 Location: Left arm, BP Patient Position: Sitting) Comment: manual   Pulse 99   Temp 98.8 °F (37.1 °C) (Oral)   Resp 18   Ht 5' (1.524 m)   Wt 211 lb (95.7 kg)   SpO2 98%   BMI 41.21 kg/m²     Physical Exam   Constitutional: She is oriented to person, place, and time. She appears well-developed and well-nourished. No distress. Cardiovascular: Normal rate, regular rhythm and normal heart sounds. Pulses:       Radial pulses are 2+ on the right side, and 2+ on the left side. Pulmonary/Chest: Effort normal and breath sounds normal.   Musculoskeletal: She exhibits no edema. Lumbar back: She exhibits tenderness and pain. She exhibits no bony tenderness. Back:    Neurological: She is alert and oriented to person, place, and time. Skin: Skin is warm and dry. Psychiatric: She has a normal mood and affect. Her behavior is normal. Thought content normal.   Nursing note and vitals reviewed. ASSESSMENT and PLAN    ICD-10-CM ICD-9-CM    1. Essential hypertension, benign I10 401.1 telmisartan-hydroCHLOROthiazide (MICARDIS HCT) 40-12.5 mg per tablet   2. Chronic left-sided low back pain without sciatica M54.5 724.2 methocarbamol (ROBAXIN) 500 mg tablet    G89.29 338.29    3. Class 3 severe obesity with serious comorbidity and body mass index (BMI) of 40.0 to 44.9 in adult, unspecified obesity type ([facility].) E66.01 278.01     Z68.41 V85.41       BP uncontrolled. Continue micardis. Discussed following a strict low sodium diet. We also discussed the need for weight loss by exercising 150 minutes per week and cutting back on caloric intake. Robaxin for chronic low back pain. Also perform stretching exercises daily. Reviewed medication and side effects. Patient agrees with the plan and verbalizes understanding. Follow-up and Dispositions    · Return in about 2 months (around 8/4/2019) for HTN.        Maryam Cordero PA-C  6/4/2019

## 2019-06-04 NOTE — PATIENT INSTRUCTIONS

## 2019-08-02 ENCOUNTER — OFFICE VISIT (OUTPATIENT)
Dept: ORTHOPEDIC SURGERY | Facility: CLINIC | Age: 49
End: 2019-08-02

## 2019-08-02 VITALS
TEMPERATURE: 98.4 F | BODY MASS INDEX: 41.62 KG/M2 | HEIGHT: 60 IN | DIASTOLIC BLOOD PRESSURE: 92 MMHG | RESPIRATION RATE: 15 BRPM | HEART RATE: 93 BPM | WEIGHT: 212 LBS | SYSTOLIC BLOOD PRESSURE: 177 MMHG

## 2019-08-02 DIAGNOSIS — G89.29 CHRONIC RIGHT SHOULDER PAIN: ICD-10-CM

## 2019-08-02 DIAGNOSIS — M75.51 CHRONIC BURSITIS OF RIGHT SHOULDER: Primary | ICD-10-CM

## 2019-08-02 DIAGNOSIS — M54.2 CERVICAL PAIN: ICD-10-CM

## 2019-08-02 DIAGNOSIS — M54.12 CERVICAL RADICULOPATHY: ICD-10-CM

## 2019-08-02 DIAGNOSIS — M25.511 CHRONIC RIGHT SHOULDER PAIN: ICD-10-CM

## 2019-08-02 RX ORDER — BISMUTH SUBSALICYLATE 262 MG
1 TABLET,CHEWABLE ORAL DAILY
COMMUNITY

## 2019-08-02 RX ORDER — BETAMETHASONE SODIUM PHOSPHATE AND BETAMETHASONE ACETATE 3; 3 MG/ML; MG/ML
6 INJECTION, SUSPENSION INTRA-ARTICULAR; INTRALESIONAL; INTRAMUSCULAR; SOFT TISSUE ONCE
Qty: 0.5 ML | Refills: 0
Start: 2019-08-02 | End: 2019-08-02

## 2019-08-02 RX ORDER — CHOLECALCIFEROL (VITAMIN D3) 125 MCG
CAPSULE ORAL DAILY
COMMUNITY

## 2019-08-02 NOTE — PROGRESS NOTES
Patient: Feliciano Hines                MRN: [id number]       SSN: [id number]  YOB: 1970        AGE: 47 y.o. SEX: female    PCP: Sanaz Jones PA-C  08/02/19    Chief Complaint   Patient presents with    Shoulder Pain     right     HISTORY:  Feliciano Hines is a 47 y.o. female who is seen for right shoulder pain. She has been experiencing increased shoulder pain for the past several months. She notes a burning sensation in her right shoulder & pain radiating down her right arm. She thinks she injured her shoulder doing lat pull-downs at the gym. She started an exercise program a couple of months ago to lose weight but she has not been able to work out anymore since her injury. She notes shoulder pain with overhead activities and at night. She also notes neck pain. She is right handed. She reports difficulty with everyday activities. She notices mild difficulty with overhead activities. She reports her pain is significantly impacting her sleep at night. 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 She had stopped working after injuring her right knee in a work related injury. She plans to return to work as soon as possible. She lives in [place] with her  and 15 yo daughter. She has another 25 yo daughter studying physical therapy at [employer]. Her 15 yo daughter will be a senior in high school. Current weight is 208 pounds. She is 5' tall.       No results found for: HBA1C, HGBE8, XGP2VAAG, CEU7WTPJ, WYT4WTIE  Weight Metrics 8/2/2019 6/4/2019 12/7/2018 11/7/2018 9/28/2018 6/9/2014 1/21/2014   Weight 212 lb 211 lb 208 lb 207 lb 203 lb 197 lb 6.4 oz 194 lb   BMI 41.4 kg/m2 41.21 kg/m2 40.62 kg/m2 40.43 kg/m2 39.65 kg/m2 38.55 kg/m2 37.89 kg/m2       Patient Active Problem List   Diagnosis Code    Palpitations R00.2    Chest pain, unspecified R07.9    Essential hypertension, benign I10    Shortness of breath R06.02    Severe obesity (BMI 35.0-39. 9) E66.01     REVIEW OF SYSTEMS: All Below are Negative except: See HPI   Constitutional: negative for fever, chills, and weight loss. Cardiovascular: negative for chest pain, claudication, leg swelling, SOB, BLAKCMON   Gastrointestinal: Negative for pain, N/V/C/D, Blood in stool or urine, dysuria,  hematuria, incontinence, pelvic pain. Musculoskeletal: See HPI   Neurological: Negative for dizziness and weakness. Negative for headaches, Visual changes, confusion, seizures   Phychiatric/Behavioral: Negative for depression, memory loss, substance  abuse. Extremities: Negative for hair changes, rash, or skin lesion changes. Hematologic: Negative for bleeding problems, bruising, pallor or swollen lymph  nodes   Peripheral Vascular: No calf pain, no circulation deficits.     Social History     Socioeconomic History    Marital status:      Spouse name: Not on file    Number of children: Not on file    Years of education: Not on file    Highest education level: Not on file   Occupational History    Not on file   Social Needs    Financial resource strain: Not on file    Food insecurity:     Worry: Not on file     Inability: Not on file    Transportation needs:     Medical: Not on file     Non-medical: Not on file   Tobacco Use    Smoking status: Never Smoker    Smokeless tobacco: Never Used   Substance and Sexual Activity    Alcohol use: No    Drug use: Not on file    Sexual activity: Yes   Lifestyle    Physical activity:     Days per week: Not on file     Minutes per session: Not on file    Stress: Not on file   Relationships    Social connections:     Talks on phone: Not on file     Gets together: Not on file     Attends Cheondoism service: Not on file     Active member of club or organization: Not on file     Attends meetings of clubs or organizations: Not on file     Relationship status: Not on file    Intimate partner violence:     Fear of current or ex partner: Not on file     Emotionally abused: Not on file     Physically abused: Not on file     Forced sexual activity: Not on file   Other Topics Concern    Not on file   Social History Narrative    Not on file      Allergies   Allergen Reactions    Vicodin [Hydrocodone-Acetaminophen] Nausea and Vomiting      Current Outpatient Medications   Medication Sig    cholecalciferol, vitamin D3, (VITAMIN D3) 2,000 unit tab Take  by mouth.  multivitamin (ONE A DAY) tablet Take 1 Tab by mouth daily.  betamethasone (CELESTONE SOLUSPAN) 6 mg/mL injection 1 mL by Intra artICUlar route once for 1 dose.  telmisartan-hydroCHLOROthiazide (MICARDIS HCT) 40-12.5 mg per tablet Take 1 Tab by mouth daily.  methocarbamol (ROBAXIN) 500 mg tablet Take 1 Tab by mouth three (3) times daily.  telmisartan (MICARDIS) 40 mg tablet Take 40 mg by mouth daily. No current facility-administered medications for this visit.        PHYSICAL EXAMINATION:  Visit Vitals  BP (!) 177/92   Pulse 93   Temp 98.4 °F (36.9 °C)   Resp 15   Ht 5' (1.524 m)   Wt 212 lb (96.2 kg)   BMI 41.40 kg/m²      ORTHO EXAMINATION:  Examination Right shoulder   Skin Intact   Effusion -   Biceps deformity -   Atrophy -   AC joint tenderness -   Acromial tenderness +   Biceps tenderness -   Forward flexion/Elevation    Active abduction    External rotation ROM 50   Internal rotation ROM 95   Apprehension -   Impingement +   Drop Arm Test -   Neurovascular Intact     Chart reviewed for the following:   Kristina CHE MD, have reviewed the History, Physical and updated the Allergic reactions for [address] performed immediately prior to start of procedure:  I, Jeremy Benito MD, have performed the following reviews on [facility] prior to the start of the procedure:            * Patient was identified by name and date of birth   * Agreement on procedure being performed was verified  * Risks and Benefits explained to the patient  * Procedure site verified and marked as necessary  * Patient was positioned for comfort  * Consent was obtained     Time: 3:45 PM    Date of procedure: 8/2/2019    Procedure performed by:  Jeremy Benito MD    Ms. Frye tolerated the procedure well with no complications. RADIOGRAPHS:  XR RT SHOULDER 11/7/18 [facility]  IMPRESSION:  1. No acute fracture-dislocation.  -I have independently reviewed these images during this office visit. -Dr. Kalina Brar:  Three views - No fractures, no acromioclavicular narrowing, no glenohumeral narrowing, no calcific densities. IMPRESSION:      ICD-10-CM ICD-9-CM    1. Chronic bursitis of right shoulder M75.51 726.10 betamethasone (CELESTONE SOLUSPAN) 6 mg/mL injection      BETAMETHASONE ACETATE & SODIUM PHOSPHATE INJECTION 3 MG EA.      DRAIN/INJECT LARGE JOINT/BURSA      MRI SHOULDER RT W CONT      XR INJ SHOULDER RT PRE CT/MRI ARTHRO   2. Cervical pain M54.2 723.1    3. Cervical radiculopathy M54.12 723.4    4. Chronic right shoulder pain M25.511 719.41 betamethasone (CELESTONE SOLUSPAN) 6 mg/mL injection    G89.29 338.29 BETAMETHASONE ACETATE & SODIUM PHOSPHATE INJECTION 3 MG EA.      DRAIN/INJECT LARGE JOINT/BURSA      MRI SHOULDER RT W CONT      XR INJ SHOULDER RT PRE CT/MRI ARTHRO     PLAN:  After discussing treatment options, patient's right shoulder was reinjected with 4 cc Marcaine and 1/2 cc Celestone. Right shoulder MR arthrogram ordered. She will follow up in 2 weeks.     Scribed by Donny Finney [facility]) as dictated by Jeremy Benito MD

## 2019-08-06 ENCOUNTER — OFFICE VISIT (OUTPATIENT)
Dept: FAMILY MEDICINE CLINIC | Age: 49
End: 2019-08-06

## 2019-08-06 ENCOUNTER — HOSPITAL ENCOUNTER (OUTPATIENT)
Dept: LAB | Age: 49
Discharge: HOME | End: 2019-08-06
Payer: OTHER GOVERNMENT

## 2019-08-06 VITALS
HEIGHT: 60 IN | HEART RATE: 88 BPM | TEMPERATURE: 98.6 F | RESPIRATION RATE: 18 BRPM | DIASTOLIC BLOOD PRESSURE: 97 MMHG | BODY MASS INDEX: 41.54 KG/M2 | WEIGHT: 211.6 LBS | OXYGEN SATURATION: 99 % | SYSTOLIC BLOOD PRESSURE: 174 MMHG

## 2019-08-06 DIAGNOSIS — Z12.11 COLON CANCER SCREENING: ICD-10-CM

## 2019-08-06 DIAGNOSIS — E66.01 CLASS 3 SEVERE OBESITY DUE TO EXCESS CALORIES WITH SERIOUS COMORBIDITY AND BODY MASS INDEX (BMI) OF 40.0 TO 44.9 IN ADULT (HCC): ICD-10-CM

## 2019-08-06 DIAGNOSIS — I10 ESSENTIAL HYPERTENSION, BENIGN: Primary | ICD-10-CM

## 2019-08-06 DIAGNOSIS — I10 ESSENTIAL HYPERTENSION, BENIGN: ICD-10-CM

## 2019-08-06 LAB
ANION GAP SERPL CALC-SCNC: 5 MMOL/L (ref 3–18)
BUN SERPL-MCNC: 17 MG/DL (ref 7–18)
BUN/CREAT SERPL: 15 (ref 12–20)
CALCIUM SERPL-MCNC: 8.9 MG/DL (ref 8.5–10.1)
CHLORIDE SERPL-SCNC: 109 MMOL/L (ref 100–111)
CO2 SERPL-SCNC: 29 MMOL/L (ref 21–32)
CREAT SERPL-MCNC: 1.16 MG/DL (ref 0.6–1.3)
GLUCOSE SERPL-MCNC: 86 MG/DL (ref 74–99)
POTASSIUM SERPL-SCNC: 4.3 MMOL/L (ref 3.5–5.5)
SODIUM SERPL-SCNC: 143 MMOL/L (ref 136–145)
TSH SERPL DL<=0.05 MIU/L-ACNC: 5.22 UIU/ML (ref 0.36–3.74)

## 2019-08-06 PROCEDURE — 82043 UR ALBUMIN QUANTITATIVE: CPT

## 2019-08-06 PROCEDURE — 84443 ASSAY THYROID STIM HORMONE: CPT

## 2019-08-06 PROCEDURE — 80048 BASIC METABOLIC PNL TOTAL CA: CPT

## 2019-08-06 RX ORDER — AMLODIPINE BESYLATE 5 MG/1
5 TABLET ORAL DAILY
Qty: 30 TAB | Refills: 2 | Status: SHIPPED | OUTPATIENT
Start: 2019-08-06 | End: 2019-09-17

## 2019-08-06 RX ORDER — TELMISARTAN AND HYDROCHLORTHIAZIDE 40; 12.5 MG/1; MG/1
1 TABLET ORAL DAILY
Qty: 30 TAB | Refills: 2 | Status: SHIPPED | OUTPATIENT
Start: 2019-08-06

## 2019-08-06 NOTE — PATIENT INSTRUCTIONS
Learning About Obesity What is obesity? Obesity means having a body mass index (BMI) of 30 or above. BMI is a number that is calculated from your weight and your height. How do you know if your weight is in the obesity range? To know if your weight is in the obesity range, your doctor looks at your body mass index (BMI) and waist size. BMI is a number that is calculated from your weight and your height. To figure out your BMI for yourself, you can use an online tool, such as http://www.[employer]Hub[employer] on the [employer] of [employer]. If your BMI is 30.0 or higher, it falls within the obesity range. Keep in mind that BMI and waist size are only guides. They are not tools to determine your ideal body weight. What causes obesity? When you take in more calories than you burn off, you gain weight. How you eat, how active you are, and other things affect how your body uses calories and whether you gain weight. If you have family members who have too much body fat, you may have inherited a tendency to gain weight. And your family also helps form your eating and lifestyle habits, which can lead to obesity. Also, our busy lives make it harder to plan and cook healthy meals. For many of us, it's easier to reach for prepared foods, go out to eat, or go to the drive-through. But these foods are often high in saturated fat and calories. Portions are often too large. What can you do to reach a healthy weight? Focus on health, not diets. Diets are hard to stay on and don't work in the long run. It is very hard to stay with a diet that includes lots of big changes in your eating habits. Instead of a diet, focus on lifestyle changes that will improve your health and achieve the right balance of energy and calories. To lose weight, you need to burn more calories than you take in.  You can do it by eating healthy foods in reasonable amounts and becoming more active, even a little bit every day. Making small changes over time can add up to a lot. Make a plan for change. Many people have found that naming their reasons for change and staying focused on their plan can make a big difference. Work with your doctor to create a plan that is right for you. · Ask yourself: Bridgett Morrison are my personal, most powerful reasons for wanting this change? What will my life look like when I've made the change? \" · Set your long-term goal. Make it specific, such as \"I will lose x pounds. \" 
· Break your long-term goal into smaller, short-term goals. Make these small steps specific and within your reach, things you know you can do. These steps are what keep you going from day to day. Talk with your doctor about other weight-loss options. If you have a BMI in a certain range and have not been able to lose weight with diet and exercise, medicine or surgery may be an option for you. Before your doctor will prescribe medicines or surgery, he or she will probably want you to be more active and follow your healthy eating plan for a period of time. These habits are key lifelong changes for managing your weight, with or without other medical treatment. And these changes can help you avoid weight-related health problems. How can you stay on your plan for change? Be ready. Choose to start during a time when there are few events that might trigger slip-ups, like holidays, social events, and high-stress periods. Decide on your first few steps. Most people have more success when they make small changes, one step at a time. For example, you might switch a daily candy bar to a piece of fruit, walk 10 minutes more, or add more vegetables to a meal. 
Line up your support people. Make sure you're not going to be alone as you make this change.  Connect with people who understand how important it is to you. Ask family members and friends for help in keeping with your plan. And think about who could make it harder for you, and how to handle them. Try tracking. People who keep track of what they eat, feel, and do are better at losing weight. Try writing down things like: · What and how much you eat. · How you feel before and after each meal. 
· Details about each meal (like eating out or at home, eating alone, or with friends or family). · What you do to be active. Look and plan. As you track, look for patterns that you may want to change. Take note of: · When you eat and whether you skip meals. · How often you eat out. · How many fruits and vegetables you eat. · When you eat beyond feeling full. · When and why you eat for reasons other than being hungry. When you stray from your plan, don't get upset. Figure out what made you slip up and how you can fix it. Can you take medicines or have surgery to lose weight? If you have a BMI in a certain range and have not been able to lose weight with diet and exercise, medicine or surgery may be an option for you. If you have a BMI of at least 30.0 (or a BMI of at least 27.0 and another health problem related to your weight), ask your doctor about weight-loss medicines. They work by making you feel less hungry, making you feel full more quickly, or changing how you digest fat. Medicines are used along with diet changes and more physical activity to help you make lasting changes. If you have a BMI of 40.0 or more (or a BMI of 35.0 or more and another health problem related to your weight), your doctor may talk with you about surgery. Weight-loss surgery has risks, and you will need to work with your doctor to compare the risk of having obesity with the risks of surgery. With any option you choose, you will still need to eat a healthy diet and get regular exercise. Follow-up care is a key part of your treatment and safety.  Be sure to make and go to all appointments, and call your doctor if you are having problems. It's also a good idea to know your test results and keep a list of the medicines you take. Where can you learn more? Go to http://mikey-sarah beth.info/. Enter N111 in the search box to learn more about \"Learning About Obesity. \" Current as of: March 28, 2019 Content Version: 12.1 © 3483-6168 "[employer]. Care instructions adapted under license by [employer] (which disclaims liability or warranty for this information). If you have questions about a medical condition or this instruction, always ask your healthcare professional. [address] any warranty or liability for your use of this information. High Blood Pressure: Care Instructions Overview It's normal for blood pressure to go up and down throughout the day. But if it stays up, you have high blood pressure. Another name for high blood pressure is hypertension. Despite what a lot of people think, high blood pressure usually doesn't cause headaches or make you feel dizzy or lightheaded. It usually has no symptoms. But it does increase your risk of stroke, heart attack, and other problems. You and your doctor will talk about your risks of these problems based on your blood pressure. Your doctor will give you a goal for your blood pressure. Your goal will be based on your health and your age. Lifestyle changes, such as eating healthy and being active, are always important to help lower blood pressure. You might also take medicine to reach your blood pressure goal. 
Follow-up care is a key part of your treatment and safety. Be sure to make and go to all appointments, and call your doctor if you are having problems. It's also a good idea to know your test results and keep a list of the medicines you take. How can you care for yourself at home? Medical treatment · If you stop taking your medicine, your blood pressure will go back up. You may take one or more types of medicine to lower your blood pressure. Be safe with medicines. Take your medicine exactly as prescribed. Call your doctor if you think you are having a problem with your medicine. · Talk to your doctor before you start taking aspirin every day. Aspirin can help certain people lower their risk of a heart attack or stroke. But taking aspirin isn't right for everyone, because it can cause serious bleeding. · See your doctor regularly. You may need to see the doctor more often at first or until your blood pressure comes down. · If you are taking blood pressure medicine, talk to your doctor before you take decongestants or anti-inflammatory medicine, such as ibuprofen. Some of these medicines can raise blood pressure. · Learn how to check your blood pressure at home. Lifestyle changes · Stay at a healthy weight. This is especially important if you put on weight around the waist. Losing even 10 pounds can help you lower your blood pressure. · If your doctor recommends it, get more exercise. Walking is a good choice. Bit by bit, increase the amount you walk every day. Try for at least 30 minutes on most days of the week. You also may want to swim, bike, or do other activities. · Avoid or limit alcohol. Talk to your doctor about whether you can drink any alcohol. · Try to limit how much sodium you eat to less than 2,300 milligrams (mg) a day. Your doctor may ask you to try to eat less than 1,500 mg a day. · Eat plenty of fruits (such as bananas and oranges), vegetables, legumes, whole grains, and low-fat dairy products. · Lower the amount of saturated fat in your diet. Saturated fat is found in animal products such as milk, cheese, and meat. Limiting these foods may help you lose weight and also lower your risk for heart disease. · Do not smoke. Smoking increases your risk for heart attack and stroke. If you need help quitting, talk to your doctor about stop-smoking programs and medicines. These can increase your chances of quitting for good. When should you call for help? Call 911 anytime you think you may need emergency care. This may mean having symptoms that suggest that your blood pressure is causing a serious heart or blood vessel problem. Your blood pressure may be over 180/120. 
 For example, call 911 if: 
  · You have symptoms of a heart attack. These may include: 
? Chest pain or pressure, or a strange feeling in the chest. 
? Sweating. ? Shortness of breath. ? Nausea or vomiting. ? Pain, pressure, or a strange feeling in the back, neck, jaw, or upper belly or in one or both shoulders or arms. ? Lightheadedness or sudden weakness. ? A fast or irregular heartbeat.  
  · You have symptoms of a stroke. These may include: 
? Sudden numbness, tingling, weakness, or loss of movement in your face, arm, or leg, especially on only one side of your body. ? Sudden vision changes. ? Sudden trouble speaking. ? Sudden confusion or trouble understanding simple statements. ? Sudden problems with walking or balance. ? A sudden, severe headache that is different from past headaches.  
  · You have severe back or belly pain.  
 Do not wait until your blood pressure comes down on its own. Get help right away. 
 Call your doctor now or seek immediate care if: 
  · Your blood pressure is much higher than normal (such as 180/120 or higher), but you don't have symptoms.  
  · You think high blood pressure is causing symptoms, such as: 
? Severe headache. 
? Blurry vision.  
 Watch closely for changes in your health, and be sure to contact your doctor if: 
  · Your blood pressure measures higher than your doctor recommends at least 2 times. That means the top number is higher or the bottom number is higher, or both.  
  · You think you may be having side effects from your blood pressure medicine. Where can you learn more? Go to http://mikey-sarah beth.info/. Enter W522 in the search box to learn more about \"High Blood Pressure: Care Instructions. \" Current as of: July 22, 2018 Content Version: 12.1 © 7657-4645 [employer]. Care instructions adapted under license by [employer] (which disclaims liability or warranty for this information). If you have questions about a medical condition or this instruction, always ask your healthcare professional. [address] any warranty or liability for your use of this information. Body Mass Index: Care Instructions Your Care Instructions Body mass index (BMI) can help you see if your weight is raising your risk for health problems. It uses a formula to compare how much you weigh with how tall you are. · A BMI lower than 18.5 is considered underweight. · A BMI between 18.5 and 24.9 is considered healthy. · A BMI between 25 and 29.9 is considered overweight. A BMI of 30 or higher is considered obese. If your BMI is in the normal range, it means that you have a lower risk for weight-related health problems. If your BMI is in the overweight or obese range, you may be at increased risk for weight-related health problems, such as high blood pressure, heart disease, stroke, arthritis or joint pain, and diabetes. If your BMI is in the underweight range, you may be at increased risk for health problems such as fatigue, lower protection (immunity) against illness, muscle loss, bone loss, hair loss, and hormone problems. BMI is just one measure of your risk for weight-related health problems. You may be at higher risk for health problems if you are not active, you eat an unhealthy diet, or you drink too much alcohol or use tobacco products. Follow-up care is a key part of your treatment and safety.  Be sure to make and go to all appointments, and call your doctor if you are having problems. It's also a good idea to know your test results and keep a list of the medicines you take. How can you care for yourself at home? · Practice healthy eating habits. This includes eating plenty of fruits, vegetables, whole grains, lean protein, and low-fat dairy. · If your doctor recommends it, get more exercise. Walking is a good choice. Bit by bit, increase the amount you walk every day. Try for at least 30 minutes on most days of the week. · Do not smoke. Smoking can increase your risk for health problems. If you need help quitting, talk to your doctor about stop-smoking programs and medicines. These can increase your chances of quitting for good. · Limit alcohol to 2 drinks a day for men and 1 drink a day for women. Too much alcohol can cause health problems. If you have a BMI higher than 25 · Your doctor may do other tests to check your risk for weight-related health problems. This may include measuring the distance around your waist. A waist measurement of more than 40 inches in men or 35 inches in women can increase the risk of weight-related health problems. · Talk with your doctor about steps you can take to stay healthy or improve your health. You may need to make lifestyle changes to lose weight and stay healthy, such as changing your diet and getting regular exercise. If you have a BMI lower than 18.5 · Your doctor may do other tests to check your risk for health problems. · Talk with your doctor about steps you can take to stay healthy or improve your health. You may need to make lifestyle changes to gain or maintain weight and stay healthy, such as getting more healthy foods in your diet and doing exercises to build muscle. Where can you learn more? Go to http://mikey-sarah beth.info/. Enter S176 in the search box to learn more about \"Body Mass Index: Care Instructions. \" Current as of: October 13, 2016 Content Version: 11.4 © 6977-9911 [employer]wise, [employer]. Care instructions adapted under license by [employer] (which disclaims liability or warranty for this information). If you have questions about a medical condition or this instruction, always ask your healthcare professional. [address] any warranty or liability for your use of this information.

## 2019-08-06 NOTE — PROGRESS NOTES
Mainor Mooney is a 47 y.o. female here for HTN follow up. She states she has been seen by ortho since her last visit for chest and arm pain, they have ordered an MRI.

## 2019-08-06 NOTE — PROGRESS NOTES
HISTORY OF PRESENT ILLNESS  Alton Lincoln is a 47 y.o. female. HPI  Alton Lincoln is a 47 y.o. female who presents to the office today for HTN  She comes in for follow up. HTN- BP elevated. She has been taking Micardis but says she did not take it this morning. Not monitoring BP at home. She saw ortho a few days ago and her BP was just as high at that time. Colon cancer screening- Needs FIT test. Will order today. No FH of colon cancer. Chief Complaint   Patient presents with    Hypertension     Current Outpatient Medications on File Prior to Visit   Medication Sig Dispense Refill    cholecalciferol, vitamin D3, (VITAMIN D3) 2,000 unit tab Take  by mouth.  multivitamin (ONE A DAY) tablet Take 1 Tab by mouth daily.  methocarbamol (ROBAXIN) 500 mg tablet Take 1 Tab by mouth three (3) times daily. 30 Tab 0     No current facility-administered medications on file prior to visit. Allergies   Allergen Reactions    Vicodin [Hydrocodone-Acetaminophen] Nausea and Vomiting     Past Medical History:   Diagnosis Date    Chest pain, unspecified 11/22/2013    resolved     Essential hypertension     Essential hypertension, benign 11/22/2013    resolved     Gout     Hypertension     Palpitations 11/22/2013    resolved     Shortness of breath 11/22/2013    resolved      Social History     Tobacco Use   Smoking Status Never Smoker   Smokeless Tobacco Never Used     Social History     Substance and Sexual Activity   Alcohol Use No     Family History   Problem Relation Age of Onset    Heart Attack Maternal Grandmother     Heart Attack Maternal Grandfather      Review of Systems   Constitutional: Negative for diaphoresis, malaise/fatigue and weight loss. Eyes: Negative for blurred vision and double vision. Respiratory: Negative for shortness of breath. Cardiovascular: Negative for chest pain, palpitations and leg swelling. Neurological: Negative for dizziness, tingling and headaches. Endo/Heme/Allergies: Does not bruise/bleed easily. Psychiatric/Behavioral: Negative for depression. The patient is not nervous/anxious. Visit Vitals  BP (!) 174/97 (BP 1 Location: Left arm, BP Patient Position: Sitting)   Pulse 88   Temp 98.6 °F (37 °C) (Oral)   Resp 18   Ht 5' (1.524 m)   Wt 211 lb 9.6 oz (96 kg)   SpO2 99%   BMI 41.33 kg/m²     Physical Exam   Constitutional: She is oriented to person, place, and time. She appears well-developed and well-nourished. No distress. Neck: Neck supple. No thyromegaly present. Cardiovascular: Normal rate, regular rhythm and normal heart sounds. Pulmonary/Chest: Effort normal and breath sounds normal. No respiratory distress. She has no wheezes. She has no rales. Musculoskeletal: She exhibits no edema. Neurological: She is alert and oriented to person, place, and time. Skin: Skin is warm and dry. Psychiatric: She has a normal mood and affect. Her behavior is normal. Thought content normal.   Nursing note and vitals reviewed. ASSESSMENT and PLAN    ICD-10-CM ICD-9-CM    1. Essential hypertension, benign U43 462.3 METABOLIC PANEL, BASIC      TSH 3RD GENERATION      MICROALBUMIN, UR, RAND W/ MICROALB/CREAT RATIO      telmisartan-hydroCHLOROthiazide (MICARDIS HCT) 40-12.5 mg per tablet      amLODIPine (NORVASC) 5 mg tablet   2. Colon cancer screening Z12.11 V76.51 OCCULT BLOOD IMMUNOASSAY,DIAGNOSTIC   3. BMI 40.0-44.9, adult [facility] Z68.41 V85.41    4. Class 3 severe obesity due to excess calories with serious comorbidity and body mass index (BMI) of 40.0 to 44.9 in adult ([facility]) E66.01 278.01     Z68.41 V85.41       BP uncontrolled. I am adding amlodipine to Micardis. Follow a low sodium diet. Work on weight loss. FIT test given in the office today. High BMI- weight has been stable. I'm hoping with weight loss, BP will come down with it. Reviewed medication and side effects. Patient agrees with the plan and verbalizes understanding.    Follow-up and Dispositions    · Return in about 1 month (around 9/6/2019) for HTN. Virgen Ortega PA-C  8/6/2019        Discussed the patient's BMI with her. The BMI follow up plan is as follows:     dietary management education, guidance, and counseling  encourage exercise  monitor weight  prescribed dietary intake    An After Visit Summary was printed and given to the patient.

## 2019-08-07 LAB
CREAT UR-MCNC: 263 MG/DL (ref 30–125)
MICROALBUMIN UR-MCNC: 155 MG/DL (ref 0–3)
MICROALBUMIN/CREAT UR-RTO: 589 MG/G (ref 0–30)

## 2019-08-12 ENCOUNTER — HOSPITAL ENCOUNTER (OUTPATIENT)
Dept: LAB | Age: 49
Discharge: HOME | End: 2019-08-12
Payer: OTHER GOVERNMENT

## 2019-08-12 DIAGNOSIS — Z12.11 COLON CANCER SCREENING: ICD-10-CM

## 2019-08-12 PROCEDURE — 82274 ASSAY TEST FOR BLOOD FECAL: CPT

## 2019-08-14 ENCOUNTER — HOSPITAL ENCOUNTER (OUTPATIENT)
Dept: MRI IMAGING | Age: 49
Discharge: HOME | End: 2019-08-14
Attending: SPECIALIST
Payer: OTHER GOVERNMENT

## 2019-08-14 ENCOUNTER — HOSPITAL ENCOUNTER (OUTPATIENT)
Dept: GENERAL RADIOLOGY | Age: 49
Discharge: HOME | End: 2019-08-14
Attending: SPECIALIST
Payer: OTHER GOVERNMENT

## 2019-08-14 DIAGNOSIS — M75.51 CHRONIC BURSITIS OF RIGHT SHOULDER: ICD-10-CM

## 2019-08-14 DIAGNOSIS — M25.511 CHRONIC RIGHT SHOULDER PAIN: ICD-10-CM

## 2019-08-14 DIAGNOSIS — G89.29 CHRONIC RIGHT SHOULDER PAIN: ICD-10-CM

## 2019-08-14 PROCEDURE — 74011000250 HC RX REV CODE- 250: Performed by: SPECIALIST

## 2019-08-14 PROCEDURE — 77002 NEEDLE LOCALIZATION BY XRAY: CPT

## 2019-08-14 PROCEDURE — A9575 INJ GADOTERATE MEGLUMI 0.1ML: HCPCS | Performed by: SPECIALIST

## 2019-08-14 PROCEDURE — 73222 MRI JOINT UPR EXTREM W/DYE: CPT

## 2019-08-14 PROCEDURE — 74011636320 HC RX REV CODE- 636/320: Performed by: SPECIALIST

## 2019-08-14 PROCEDURE — 74011250636 HC RX REV CODE- 250/636: Performed by: SPECIALIST

## 2019-08-14 RX ORDER — SODIUM CHLORIDE 9 MG/ML
10 INJECTION INTRAMUSCULAR; INTRAVENOUS; SUBCUTANEOUS
Status: COMPLETED | OUTPATIENT
Start: 2019-08-14 | End: 2019-08-14

## 2019-08-14 RX ORDER — LIDOCAINE HYDROCHLORIDE 10 MG/ML
30 INJECTION, SOLUTION EPIDURAL; INFILTRATION; INTRACAUDAL; PERINEURAL ONCE
Status: COMPLETED | OUTPATIENT
Start: 2019-08-14 | End: 2019-08-14

## 2019-08-14 RX ORDER — GADOTERATE MEGLUMINE 376.9 MG/ML
5 INJECTION INTRAVENOUS
Status: COMPLETED | OUTPATIENT
Start: 2019-08-14 | End: 2019-08-14

## 2019-08-14 RX ADMIN — GADOTERATE MEGLUMINE 0.15 ML: 376.9 INJECTION INTRAVENOUS at 11:27

## 2019-08-14 RX ADMIN — LIDOCAINE HYDROCHLORIDE 10 ML: 10 INJECTION, SOLUTION EPIDURAL; INFILTRATION; INTRACAUDAL; PERINEURAL at 11:27

## 2019-08-14 RX ADMIN — SODIUM CHLORIDE 10 ML: 9 INJECTION, SOLUTION INTRAMUSCULAR; INTRAVENOUS; SUBCUTANEOUS at 11:27

## 2019-08-14 RX ADMIN — IOPAMIDOL 12 ML: 408 INJECTION, SOLUTION INTRATHECAL at 11:27

## 2019-08-15 LAB — HEMOCCULT STL QL IA: NEGATIVE

## 2019-08-16 ENCOUNTER — OFFICE VISIT (OUTPATIENT)
Dept: ORTHOPEDIC SURGERY | Facility: CLINIC | Age: 49
End: 2019-08-16

## 2019-08-16 VITALS
SYSTOLIC BLOOD PRESSURE: 165 MMHG | HEART RATE: 98 BPM | HEIGHT: 60 IN | OXYGEN SATURATION: 98 % | BODY MASS INDEX: 41.54 KG/M2 | WEIGHT: 211.6 LBS | TEMPERATURE: 98.4 F | RESPIRATION RATE: 18 BRPM | DIASTOLIC BLOOD PRESSURE: 110 MMHG

## 2019-08-16 DIAGNOSIS — M75.121 COMPLETE TEAR OF RIGHT ROTATOR CUFF, UNSPECIFIED WHETHER TRAUMATIC: ICD-10-CM

## 2019-08-16 DIAGNOSIS — M75.51 CHRONIC BURSITIS OF RIGHT SHOULDER: Primary | ICD-10-CM

## 2019-08-16 DIAGNOSIS — M25.511 CHRONIC RIGHT SHOULDER PAIN: ICD-10-CM

## 2019-08-16 DIAGNOSIS — G89.29 CHRONIC RIGHT SHOULDER PAIN: ICD-10-CM

## 2019-08-16 NOTE — PROGRESS NOTES
Patient: Jose Morrow                MRN: [id number]       SSN: [id number]  YOB: 1970        AGE: 47 y.o. SEX: female    PCP: Herbert Montgomery PA-C  08/16/19    Chief Complaint   Patient presents with    Shoulder Pain     Right     HISTORY:  Jose Morrow is a 47 y.o. female who is seen for right shoulder pain. She has been experiencing shoulder pain for the past several months but she has been feeling some better recently. Her pain is intermittent. She notes a burning sensation in her right shoulder & pain radiating down her right arm. She thinks she injured her shoulder doing lat pull-downs at the gym. She started an exercise program a couple of months ago to lose weight but she has not been able to work out anymore since her injury. She notes shoulder pain with overhead activities and at night. She has difficulty sleeping on her right side. She also notes neck pain. She is right handed. She reports difficulty with everyday activities. She notices mild difficulty with overhead activities. Pain Assessment  8/16/2019   Location of Pain Shoulder   Location Modifiers Right   Severity of Pain 0   Quality of Pain -   Duration of Pain -   Frequency of Pain -   Aggravating Factors -   Aggravating Factors Comment -   Limiting Behavior No   Relieving Factors -   Result of Injury -     Occupation, etc:  Ms. 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 She plans to return to work as soon as possible. She had stopped working after injuring her right knee in a work related injury. She would like to start working again soon. She lives in [place] with her  and 15 yo daughter. She has another 25 yo daughter studying physical therapy at [employer]. Her 15 yo daughter will be a senior in high school. Current weight is 208 pounds. She is 5' tall.       No results found for: HBA1C, HGBE8, BCP1SADC, TSD4PSTJ, VBK9PWLX  Weight Metrics 8/16/2019 8/6/2019 8/2/2019 6/4/2019 12/7/2018 11/7/2018 9/28/2018   Weight 211 lb 9.6 oz 211 lb 9.6 oz 212 lb 211 lb 208 lb 207 lb 203 lb   BMI 41.33 kg/m2 41.33 kg/m2 41.4 kg/m2 41.21 kg/m2 40.62 kg/m2 40.43 kg/m2 39.65 kg/m2       Patient Active Problem List   Diagnosis Code    Palpitations R00.2    Chest pain, unspecified R07.9    Essential hypertension, benign I10    Shortness of breath R06.02    Severe obesity (BMI 35.0-39. 9) E66.01     REVIEW OF SYSTEMS: All Below are Negative except: See HPI   Constitutional: negative for fever, chills, and weight loss. Cardiovascular: negative for chest pain, claudication, leg swelling, SOB, BLACKMON   Gastrointestinal: Negative for pain, N/V/C/D, Blood in stool or urine, dysuria,  hematuria, incontinence, pelvic pain. Musculoskeletal: See HPI   Neurological: Negative for dizziness and weakness. Negative for headaches, Visual changes, confusion, seizures   Phychiatric/Behavioral: Negative for depression, memory loss, substance  abuse. Extremities: Negative for hair changes, rash, or skin lesion changes. Hematologic: Negative for bleeding problems, bruising, pallor or swollen lymph  nodes   Peripheral Vascular: No calf pain, no circulation deficits.     Social History     Socioeconomic History    Marital status:      Spouse name: Not on file    Number of children: Not on file    Years of education: Not on file    Highest education level: Not on file   Occupational History    Not on file   Social Needs    Financial resource strain: Not on file    Food insecurity:     Worry: Not on file     Inability: Not on file    Transportation needs:     Medical: Not on file     Non-medical: Not on file   Tobacco Use    Smoking status: Never Smoker    Smokeless tobacco: Never Used   Substance and Sexual Activity    Alcohol use: No    Drug use: Not on file    Sexual activity: Yes   Lifestyle    Physical activity:     Days per week: Not on file     Minutes per session: Not on file    Stress: Not on file Relationships    Social connections:     Talks on phone: Not on file     Gets together: Not on file     Attends Hoahaoism service: Not on file     Active member of club or organization: Not on file     Attends meetings of clubs or organizations: Not on file     Relationship status: Not on file    Intimate partner violence:     Fear of current or ex partner: Not on file     Emotionally abused: Not on file     Physically abused: Not on file     Forced sexual activity: Not on file   Other Topics Concern    Not on file   Social History Narrative    Not on file      Allergies   Allergen Reactions    Vicodin [Hydrocodone-Acetaminophen] Nausea and Vomiting      Current Outpatient Medications   Medication Sig    telmisartan-hydroCHLOROthiazide (MICARDIS HCT) 40-12.5 mg per tablet Take 1 Tab by mouth daily.  cholecalciferol, vitamin D3, (VITAMIN D3) 2,000 unit tab Take  by mouth.  multivitamin (ONE A DAY) tablet Take 1 Tab by mouth daily.  amLODIPine (NORVASC) 5 mg tablet Take 1 Tab by mouth daily.  methocarbamol (ROBAXIN) 500 mg tablet Take 1 Tab by mouth three (3) times daily. No current facility-administered medications for this visit. PHYSICAL EXAMINATION:  Visit Vitals  BP (!) 165/110   Pulse 98   Temp 98.4 °F (36.9 °C) (Oral)   Resp 18   Ht 5' (1.524 m)   Wt 211 lb 9.6 oz (96 kg)   SpO2 98%   BMI 41.33 kg/m²      ORTHO EXAMINATION:  Examination Right shoulder   Skin Intact   Effusion -   Biceps deformity -   Atrophy -   AC joint tenderness -   Acromial tenderness +   Biceps tenderness -   Forward flexion/Elevation    Active abduction    External rotation ROM 70   Internal rotation ROM 80   Apprehension -   Impingement +   Drop Arm Test -   Neurovascular Intact     MRI RIGHT SHOULDER W CONT 8/14/19 [facility]  IMPRESSION:  Motion limited exam.  Full-thickness tearing of supraspinatus with interstitial delamination to the  myotendinous junction.   Subacromial subdeltoid effusion with irregular bursal borders. Correlate for  bursitis. Markedly diminutive long head biceps tendon. Correlate clinically. Please see report for additional findings and further details. RADIOGRAPHS:  XR RT SHOULDER 11/7/18 [facility]  IMPRESSION:  1. No acute fracture-dislocation.  -I have independently reviewed these images during this office visit. -Dr. Jasmin Cox:  Three views - No fractures, no acromioclavicular narrowing, no glenohumeral narrowing, no calcific densities. IMPRESSION:      ICD-10-CM ICD-9-CM    1. Chronic bursitis of right shoulder M75.51 726.10    2. Chronic right shoulder pain M25.511 719.41     G89.29 338.29    3. Complete tear of right rotator cuff, unspecified whether traumatic M75.121 727.61      PLAN:  Right shoulder MR arthrogram reviewed today. We discussed possible need for right RCR or arthroscopy at some time in the future if pain. continues. She will follow up PRN with Dr. Iggy Diaz for orthopedic shoulder surgery consultation.       Scribed by Viki Villaseñor (Sam Jones) as dictated by Aristides Rodrigues MD

## 2019-08-19 DIAGNOSIS — R79.89 ABNORMAL TSH: Primary | ICD-10-CM

## 2019-08-19 NOTE — PROGRESS NOTES
Kidney function is quite abnormal and this is likely due to her ongoing uncontrolled BP. I need her to follow up for a nurse visit in one week to see what her BP looks like- she needs to take her BP meds several hours prior to her visit. I am considering sending her to a Nephrologist. Also, TSH slightly elevated. I will get a thyroid panel when she comes in for her BP check.

## 2019-08-19 NOTE — PROGRESS NOTES
Left message on patient's voicemail to return call for results. Also please give her her Fit test results.

## 2019-08-19 NOTE — PROGRESS NOTES
Left message on nam's voicemail to return call for results. She also has lab results that need to be addressed.

## 2019-08-19 NOTE — PROGRESS NOTES
Patient called back and was made aware. She states she already sees a nephrologist on the [facility] base and sees him every 6-12 months. I am leaving a copy of these results up front and she will come by and pick those up so she can share them with her nephrologist. She also states that she has not picked up the medication because she doesn't feel like she needs it as she is convinced her BP is elevated due to increased stress in her life right now. I asked her to come in for a BP check in a week and if her BP is still elevated, she really needs to start taking the meds as her kidneys are really being affected now. She voiced understanding.

## 2019-08-27 ENCOUNTER — OFFICE VISIT (OUTPATIENT)
Dept: ORTHOPEDIC SURGERY | Facility: CLINIC | Age: 49
End: 2019-08-27

## 2019-08-27 VITALS
BODY MASS INDEX: 41.82 KG/M2 | RESPIRATION RATE: 16 BRPM | OXYGEN SATURATION: 98 % | HEART RATE: 98 BPM | WEIGHT: 213 LBS | HEIGHT: 60 IN | DIASTOLIC BLOOD PRESSURE: 96 MMHG | SYSTOLIC BLOOD PRESSURE: 173 MMHG | TEMPERATURE: 98 F

## 2019-08-27 DIAGNOSIS — M75.121 NONTRAUMATIC COMPLETE TEAR OF RIGHT ROTATOR CUFF: Primary | ICD-10-CM

## 2019-08-27 DIAGNOSIS — M75.41 IMPINGEMENT SYNDROME OF RIGHT SHOULDER: ICD-10-CM

## 2019-08-27 DIAGNOSIS — M75.21 TENDINITIS OF LONG HEAD OF BICEPS BRACHII OF RIGHT SHOULDER: ICD-10-CM

## 2019-08-27 NOTE — H&P (VIEW-ONLY)
Patient: Lindsey Bliss                MRN: [id number]       SSN: [id number] YOB: 1970        AGE: 47 y.o. SEX: female Body mass index is 41.6 kg/m². PCP: Jaswinder Loredo PA-C 
08/27/19 Chief Complaint: Right shoulder pain HISTORY OF PRESENT ILLNESS:   Jez Marin is a 47year-old female who presents to the office today with right shoulder pain. She has had pain for up to about a year. She has seen Dr. Irma Hudson in the past and had a cortisone shot. Unfortunately, the second one did not give her much relief. She had an MRI, which showed a rotator cuff tear and she is here today to discuss further treatment. Past Medical History:  
Diagnosis Date  Chest pain, unspecified 11/22/2013  
 resolved  Essential hypertension  Essential hypertension, benign 11/22/2013  
 resolved  Gout  Hypertension  Palpitations 11/22/2013  
 resolved  Shortness of breath 11/22/2013  
 resolved Family History Problem Relation Age of Onset  Heart Attack Maternal Grandmother  Heart Attack Maternal Grandfather Current Outpatient Medications Medication Sig Dispense Refill  telmisartan-hydroCHLOROthiazide (MICARDIS HCT) 40-12.5 mg per tablet Take 1 Tab by mouth daily. 30 Tab 2  cholecalciferol, vitamin D3, (VITAMIN D3) 2,000 unit tab Take  by mouth.  multivitamin (ONE A DAY) tablet Take 1 Tab by mouth daily.  amLODIPine (NORVASC) 5 mg tablet Take 1 Tab by mouth daily. 30 Tab 2  
 methocarbamol (ROBAXIN) 500 mg tablet Take 1 Tab by mouth three (3) times daily. 30 Tab 0 Allergies Allergen Reactions  Vicodin [Hydrocodone-Acetaminophen] Nausea and Vomiting Past Surgical History:  
Procedure Laterality Date  HX HYSTERECTOMY  HX ORTHOPAEDIC    
 right knee Social History Socioeconomic History  Marital status:  Spouse name: Not on file  Number of children: Not on file  Years of education: Not on file  Highest education level: Not on file Occupational History  Not on file Social Needs  Financial resource strain: Not on file  Food insecurity:  
  Worry: Not on file Inability: Not on file  Transportation needs:  
  Medical: Not on file Non-medical: Not on file Tobacco Use  Smoking status: Never Smoker  Smokeless tobacco: Never Used Substance and Sexual Activity  Alcohol use: No  
 Drug use: Not on file  Sexual activity: Yes Lifestyle  Physical activity:  
  Days per week: Not on file Minutes per session: Not on file  Stress: Not on file Relationships  Social connections:  
  Talks on phone: Not on file Gets together: Not on file Attends Holiness service: Not on file Active member of club or organization: Not on file Attends meetings of clubs or organizations: Not on file Relationship status: Not on file  Intimate partner violence:  
  Fear of current or ex partner: Not on file Emotionally abused: Not on file Physically abused: Not on file Forced sexual activity: Not on file Other Topics Concern  Not on file Social History Narrative  Not on file REVIEW OF SYSTEMS:   
 
CON: negative for recent weight loss/gain, fever, or chills EYE: negative for double or blurry vision ENT: negative for hoarseness RS:   negative for cough, URI, SOB 
CV:  negative for chest pain, palpitations GI:    negative for blood in stool, nausea/vomiting :  negative for blood in urine MS: As per HPI Other systems reviewed and noted below. PHYSICAL EXAMINATION: 
Visit Vitals BP (!) 173/96 Pulse 98 Temp 98 °F (36.7 °C) (Oral) Resp 16 Ht 5' (1.524 m) Wt 213 lb (96.6 kg) SpO2 98% BMI 41.60 kg/m² Body mass index is 41.6 kg/m². GENERAL: Alert and oriented x3, in no acute distress, well-developed, well-nourished. HEENT: Normocephalic, atraumatic. RESP: Non labored breathing with equal chest rise on inspiration. CV: Well perfused extremities. No cyanosis or clubbing noted. ABDOMEN: Soft, non-tender, non-distended. PHYSICAL EXAM:  Physical exam of the right shoulder with no obvious decreased range of motion. She does have pain with overhead reaching. She has pain and some weakness with supraspinatus and biceps stress testing. Mild pain with impingement. She has no pain with cross body adduction. She is neurovascularly intact distally. IMAGING:  An MRI of the right shoulder was reviewed in the office today, which shows a full thickness tear of the anterior aspect of the supraspinatus with some mild biceps tendinopathy. ASSESSMENT AND PLAN:   Lexi Barrera is a 47year-old female with right shoulder pain. I had a lengthy discussion with her today regarding treatment options. We discussed operative and nonoperative management of her rotator cuff tear and she would like to have surgery. We will start the process of getting it set up.    
 
 
 
 
 
Electronically signed by: Magdiel Harris MD

## 2019-08-27 NOTE — PROGRESS NOTES
Patient: Mehul Maynard                MRN: [id number]       SSN: [id number]  YOB: 1970        AGE: 47 y.o. SEX: female  Body mass index is 41.6 kg/m². PCP: Prasad Hodgson PA-C  08/27/19    Chief Complaint: Right shoulder pain    HISTORY OF PRESENT ILLNESS:   Saskia Godinez is a 47year-old female who presents to the office today with right shoulder pain. She has had pain for up to about a year. She has seen Dr. Meche Butcher in the past and had a cortisone shot. Unfortunately, the second one did not give her much relief. She had an MRI, which showed a rotator cuff tear and she is here today to discuss further treatment. Past Medical History:   Diagnosis Date    Chest pain, unspecified 11/22/2013    resolved     Essential hypertension     Essential hypertension, benign 11/22/2013    resolved     Gout     Hypertension     Palpitations 11/22/2013    resolved     Shortness of breath 11/22/2013    resolved        Family History   Problem Relation Age of Onset    Heart Attack Maternal Grandmother     Heart Attack Maternal Grandfather        Current Outpatient Medications   Medication Sig Dispense Refill    telmisartan-hydroCHLOROthiazide (MICARDIS HCT) 40-12.5 mg per tablet Take 1 Tab by mouth daily. 30 Tab 2    cholecalciferol, vitamin D3, (VITAMIN D3) 2,000 unit tab Take  by mouth.  multivitamin (ONE A DAY) tablet Take 1 Tab by mouth daily.  amLODIPine (NORVASC) 5 mg tablet Take 1 Tab by mouth daily. 30 Tab 2    methocarbamol (ROBAXIN) 500 mg tablet Take 1 Tab by mouth three (3) times daily.  30 Tab 0       Allergies   Allergen Reactions    Vicodin [Hydrocodone-Acetaminophen] Nausea and Vomiting       Past Surgical History:   Procedure Laterality Date    HX HYSTERECTOMY      HX ORTHOPAEDIC      right knee       Social History     Socioeconomic History    Marital status:      Spouse name: Not on file    Number of children: Not on file    Years of education: Not on file    Highest education level: Not on file   Occupational History    Not on file   Social Needs    Financial resource strain: Not on file    Food insecurity:     Worry: Not on file     Inability: Not on file    Transportation needs:     Medical: Not on file     Non-medical: Not on file   Tobacco Use    Smoking status: Never Smoker    Smokeless tobacco: Never Used   Substance and Sexual Activity    Alcohol use: No    Drug use: Not on file    Sexual activity: Yes   Lifestyle    Physical activity:     Days per week: Not on file     Minutes per session: Not on file    Stress: Not on file   Relationships    Social connections:     Talks on phone: Not on file     Gets together: Not on file     Attends Amish service: Not on file     Active member of club or organization: Not on file     Attends meetings of clubs or organizations: Not on file     Relationship status: Not on file    Intimate partner violence:     Fear of current or ex partner: Not on file     Emotionally abused: Not on file     Physically abused: Not on file     Forced sexual activity: Not on file   Other Topics Concern    Not on file   Social History Narrative    Not on file       REVIEW OF SYSTEMS:      CON: negative for recent weight loss/gain, fever, or chills  EYE: negative for double or blurry vision  ENT: negative for hoarseness  RS:   negative for cough, URI, SOB  CV:  negative for chest pain, palpitations  GI:    negative for blood in stool, nausea/vomiting  :  negative for blood in urine  MS: As per HPI  Other systems reviewed and noted below. PHYSICAL EXAMINATION:  Visit Vitals  BP (!) 173/96   Pulse 98   Temp 98 °F (36.7 °C) (Oral)   Resp 16   Ht 5' (1.524 m)   Wt 213 lb (96.6 kg)   SpO2 98%   BMI 41.60 kg/m²     Body mass index is 41.6 kg/m². GENERAL: Alert and oriented x3, in no acute distress, well-developed, well-nourished. HEENT: Normocephalic, atraumatic.     RESP: Non labored breathing with equal chest rise on inspiration. CV: Well perfused extremities. No cyanosis or clubbing noted. ABDOMEN: Soft, non-tender, non-distended. PHYSICAL EXAM:  Physical exam of the right shoulder with no obvious decreased range of motion. She does have pain with overhead reaching. She has pain and some weakness with supraspinatus and biceps stress testing. Mild pain with impingement. She has no pain with cross body adduction. She is neurovascularly intact distally. IMAGING:  An MRI of the right shoulder was reviewed in the office today, which shows a full thickness tear of the anterior aspect of the supraspinatus with some mild biceps tendinopathy. ASSESSMENT AND PLAN:   Chris Ortiz is a 47year-old female with right shoulder pain. I had a lengthy discussion with her today regarding treatment options. We discussed operative and nonoperative management of her rotator cuff tear and she would like to have surgery. We will start the process of getting it set up.               Electronically signed by: No Dow MD

## 2019-09-03 DIAGNOSIS — Z01.812 PRE-OPERATIVE LABORATORY EXAMINATION: ICD-10-CM

## 2019-09-03 DIAGNOSIS — M75.121 NONTRAUMATIC COMPLETE TEAR OF RIGHT ROTATOR CUFF: Primary | ICD-10-CM

## 2019-09-03 DIAGNOSIS — M75.21 TENDINITIS OF LONG HEAD OF BICEPS BRACHII OF RIGHT SHOULDER: ICD-10-CM

## 2019-09-03 DIAGNOSIS — Z01.810 PREOP CARDIOVASCULAR EXAM: ICD-10-CM

## 2019-09-03 DIAGNOSIS — M75.41 IMPINGEMENT SYNDROME OF RIGHT SHOULDER: ICD-10-CM

## 2019-09-10 ENCOUNTER — HOSPITAL ENCOUNTER (OUTPATIENT)
Dept: PREADMISSION TESTING | Age: 49
Discharge: HOME | End: 2019-09-10
Payer: OTHER GOVERNMENT

## 2019-09-10 DIAGNOSIS — Z01.812 PRE-OPERATIVE LABORATORY EXAMINATION: ICD-10-CM

## 2019-09-10 DIAGNOSIS — M75.121 NONTRAUMATIC COMPLETE TEAR OF RIGHT ROTATOR CUFF: ICD-10-CM

## 2019-09-10 DIAGNOSIS — M75.41 IMPINGEMENT SYNDROME OF RIGHT SHOULDER: ICD-10-CM

## 2019-09-10 DIAGNOSIS — M75.21 TENDINITIS OF LONG HEAD OF BICEPS BRACHII OF RIGHT SHOULDER: ICD-10-CM

## 2019-09-10 DIAGNOSIS — Z01.810 PREOP CARDIOVASCULAR EXAM: ICD-10-CM

## 2019-09-10 LAB
ALBUMIN SERPL-MCNC: 3.9 G/DL (ref 3.4–5)
ALBUMIN/GLOB SERPL: 1.1 {RATIO} (ref 0.8–1.7)
ALP SERPL-CCNC: 105 U/L (ref 45–117)
ALT SERPL-CCNC: 19 U/L (ref 13–56)
ANION GAP SERPL CALC-SCNC: 3 MMOL/L (ref 3–18)
AST SERPL-CCNC: 11 U/L (ref 10–38)
ATRIAL RATE: 90 BPM
BASOPHILS # BLD: 0 K/UL (ref 0–0.1)
BASOPHILS NFR BLD: 0 % (ref 0–2)
BILIRUB SERPL-MCNC: 0.5 MG/DL (ref 0.2–1)
BUN SERPL-MCNC: 17 MG/DL (ref 7–18)
BUN/CREAT SERPL: 15 (ref 12–20)
CALCIUM SERPL-MCNC: 9.4 MG/DL (ref 8.5–10.1)
CALCULATED P AXIS, ECG09: 60 DEGREES
CALCULATED R AXIS, ECG10: 49 DEGREES
CALCULATED T AXIS, ECG11: 71 DEGREES
CHLORIDE SERPL-SCNC: 107 MMOL/L (ref 100–111)
CO2 SERPL-SCNC: 30 MMOL/L (ref 21–32)
CREAT SERPL-MCNC: 1.14 MG/DL (ref 0.6–1.3)
DIAGNOSIS, 93000: NORMAL
DIFFERENTIAL METHOD BLD: ABNORMAL
EOSINOPHIL # BLD: 0.1 K/UL (ref 0–0.4)
EOSINOPHIL NFR BLD: 2 % (ref 0–5)
ERYTHROCYTE [DISTWIDTH] IN BLOOD BY AUTOMATED COUNT: 14.3 % (ref 11.6–14.5)
GLOBULIN SER CALC-MCNC: 3.7 G/DL (ref 2–4)
GLUCOSE SERPL-MCNC: 98 MG/DL (ref 74–99)
HCT VFR BLD AUTO: 41.8 % (ref 35–45)
HGB BLD-MCNC: 12.8 G/DL (ref 12–16)
LYMPHOCYTES # BLD: 2.2 K/UL (ref 0.9–3.6)
LYMPHOCYTES NFR BLD: 31 % (ref 21–52)
MCH RBC QN AUTO: 25.2 PG (ref 24–34)
MCHC RBC AUTO-ENTMCNC: 30.6 G/DL (ref 31–37)
MCV RBC AUTO: 82.3 FL (ref 74–97)
MONOCYTES # BLD: 0.3 K/UL (ref 0.05–1.2)
MONOCYTES NFR BLD: 5 % (ref 3–10)
NEUTS SEG # BLD: 4.3 K/UL (ref 1.8–8)
NEUTS SEG NFR BLD: 62 % (ref 40–73)
P-R INTERVAL, ECG05: 140 MS
PLATELET # BLD AUTO: 288 K/UL (ref 135–420)
PMV BLD AUTO: 11.2 FL (ref 9.2–11.8)
POTASSIUM SERPL-SCNC: 4.9 MMOL/L (ref 3.5–5.5)
PROT SERPL-MCNC: 7.6 G/DL (ref 6.4–8.2)
Q-T INTERVAL, ECG07: 354 MS
QRS DURATION, ECG06: 90 MS
QTC CALCULATION (BEZET), ECG08: 433 MS
RBC # BLD AUTO: 5.08 M/UL (ref 4.2–5.3)
SODIUM SERPL-SCNC: 140 MMOL/L (ref 136–145)
VENTRICULAR RATE, ECG03: 90 BPM
WBC # BLD AUTO: 6.9 K/UL (ref 4.6–13.2)

## 2019-09-10 PROCEDURE — 80053 COMPREHEN METABOLIC PANEL: CPT

## 2019-09-10 PROCEDURE — 93005 ELECTROCARDIOGRAM TRACING: CPT

## 2019-09-10 PROCEDURE — 36415 COLL VENOUS BLD VENIPUNCTURE: CPT

## 2019-09-10 PROCEDURE — 85025 COMPLETE CBC W/AUTO DIFF WBC: CPT

## 2019-09-21 ENCOUNTER — ANESTHESIA EVENT (OUTPATIENT)
Dept: SURGERY | Age: 49
End: 2019-09-21
Payer: OTHER GOVERNMENT

## 2019-09-23 ENCOUNTER — HOSPITAL ENCOUNTER (OUTPATIENT)
Age: 49
Setting detail: OUTPATIENT SURGERY
Discharge: HOME | End: 2019-09-23
Attending: ORTHOPAEDIC SURGERY | Admitting: ORTHOPAEDIC SURGERY
Payer: OTHER GOVERNMENT

## 2019-09-23 ENCOUNTER — ANESTHESIA (OUTPATIENT)
Dept: SURGERY | Age: 49
End: 2019-09-23
Payer: OTHER GOVERNMENT

## 2019-09-23 VITALS
RESPIRATION RATE: 20 BRPM | HEIGHT: 60 IN | OXYGEN SATURATION: 99 % | TEMPERATURE: 98.5 F | DIASTOLIC BLOOD PRESSURE: 99 MMHG | HEART RATE: 88 BPM | SYSTOLIC BLOOD PRESSURE: 174 MMHG | WEIGHT: 216 LBS | BODY MASS INDEX: 42.41 KG/M2

## 2019-09-23 DIAGNOSIS — G89.18 PAIN FOLLOWING SURGERY OR PROCEDURE: Primary | ICD-10-CM

## 2019-09-23 PROCEDURE — 77030004453 HC BUR SHV STRY -B: Performed by: ORTHOPAEDIC SURGERY

## 2019-09-23 PROCEDURE — 77030013079 HC BLNKT BAIR HGGR 3M -A: Performed by: ORTHOPAEDIC SURGERY

## 2019-09-23 PROCEDURE — 76010000153 HC OR TIME 1.5 TO 2 HR: Performed by: ORTHOPAEDIC SURGERY

## 2019-09-23 PROCEDURE — C1713 ANCHOR/SCREW BN/BN,TIS/BN: HCPCS | Performed by: ORTHOPAEDIC SURGERY

## 2019-09-23 PROCEDURE — 77030002916 HC SUT ETHLN J&J -A: Performed by: ORTHOPAEDIC SURGERY

## 2019-09-23 PROCEDURE — 77030003598 HC NDL MULT/FIRE ARTH -C: Performed by: ORTHOPAEDIC SURGERY

## 2019-09-23 PROCEDURE — 64450 NJX AA&/STRD OTHER PN/BRANCH: CPT | Performed by: ANESTHESIOLOGY

## 2019-09-23 PROCEDURE — 77030040361 HC SLV COMPR DVT MDII -B: Performed by: ORTHOPAEDIC SURGERY

## 2019-09-23 PROCEDURE — 77030008683 HC TU ET CUF COVD -A: Performed by: NURSE ANESTHETIST, CERTIFIED REGISTERED

## 2019-09-23 PROCEDURE — 74011000250 HC RX REV CODE- 250: Performed by: ANESTHESIOLOGY

## 2019-09-23 PROCEDURE — 74011250636 HC RX REV CODE- 250/636: Performed by: ORTHOPAEDIC SURGERY

## 2019-09-23 PROCEDURE — 76210000017 HC OR PH I REC 1.5 TO 2 HR: Performed by: ORTHOPAEDIC SURGERY

## 2019-09-23 PROCEDURE — 74011250637 HC RX REV CODE- 250/637: Performed by: NURSE ANESTHETIST, CERTIFIED REGISTERED

## 2019-09-23 PROCEDURE — 77030022036 HC BLD SHV TOMCAT STRY -B: Performed by: ORTHOPAEDIC SURGERY

## 2019-09-23 PROCEDURE — 74011250636 HC RX REV CODE- 250/636

## 2019-09-23 PROCEDURE — 77030008574 HC TBNG SUC IRR STRY -B: Performed by: ORTHOPAEDIC SURGERY

## 2019-09-23 PROCEDURE — 74011000250 HC RX REV CODE- 250

## 2019-09-23 PROCEDURE — 77030010427: Performed by: ORTHOPAEDIC SURGERY

## 2019-09-23 PROCEDURE — 74011250636 HC RX REV CODE- 250/636: Performed by: ANESTHESIOLOGY

## 2019-09-23 PROCEDURE — 74011250636 HC RX REV CODE- 250/636: Performed by: NURSE ANESTHETIST, CERTIFIED REGISTERED

## 2019-09-23 PROCEDURE — 76210000020 HC REC RM PH II FIRST 0.5 HR: Performed by: ORTHOPAEDIC SURGERY

## 2019-09-23 PROCEDURE — 77030003601 HC NDL NRV BLK BBMI -A: Performed by: ORTHOPAEDIC SURGERY

## 2019-09-23 PROCEDURE — 77030018836 HC SOL IRR NACL ICUM -A: Performed by: ORTHOPAEDIC SURGERY

## 2019-09-23 PROCEDURE — 76942 ECHO GUIDE FOR BIOPSY: CPT | Performed by: ANESTHESIOLOGY

## 2019-09-23 PROCEDURE — 77030027688 HC DRSG MEPILEX 16-48IN NO BORD MOLN -A: Performed by: ORTHOPAEDIC SURGERY

## 2019-09-23 PROCEDURE — 77030026438 HC STYL ET INTUB CARD -A: Performed by: NURSE ANESTHETIST, CERTIFIED REGISTERED

## 2019-09-23 PROCEDURE — 74011000272 HC RX REV CODE- 272: Performed by: ORTHOPAEDIC SURGERY

## 2019-09-23 PROCEDURE — 77030002967 HC SUT PDS J&J -B: Performed by: ORTHOPAEDIC SURGERY

## 2019-09-23 PROCEDURE — 77030017964 HC PRB COAG4 STRY -C: Performed by: ORTHOPAEDIC SURGERY

## 2019-09-23 PROCEDURE — 76060000034 HC ANESTHESIA 1.5 TO 2 HR: Performed by: ORTHOPAEDIC SURGERY

## 2019-09-23 DEVICE — ANCHOR SUT L14.7MM DIA5.5MM 2 NO2 TIGERTAIL FULL THRD: Type: IMPLANTABLE DEVICE | Site: SHOULDER | Status: FUNCTIONAL

## 2019-09-23 DEVICE — ANCHOR SUTURE BIOCOMP 4.75X19.1 MM SWIVELOCK C: Type: IMPLANTABLE DEVICE | Site: SHOULDER | Status: FUNCTIONAL

## 2019-09-23 RX ORDER — SODIUM CHLORIDE 0.9 % (FLUSH) 0.9 %
5-40 SYRINGE (ML) INJECTION EVERY 8 HOURS
Status: CANCELLED | OUTPATIENT
Start: 2019-09-23

## 2019-09-23 RX ORDER — ROCURONIUM BROMIDE 10 MG/ML
INJECTION, SOLUTION INTRAVENOUS AS NEEDED
Status: DISCONTINUED | OUTPATIENT
Start: 2019-09-23 | End: 2019-09-23 | Stop reason: HOSPADM

## 2019-09-23 RX ORDER — LIDOCAINE HYDROCHLORIDE 20 MG/ML
INJECTION, SOLUTION EPIDURAL; INFILTRATION; INTRACAUDAL; PERINEURAL AS NEEDED
Status: DISCONTINUED | OUTPATIENT
Start: 2019-09-23 | End: 2019-09-23 | Stop reason: HOSPADM

## 2019-09-23 RX ORDER — MIDAZOLAM HYDROCHLORIDE 1 MG/ML
2 INJECTION, SOLUTION INTRAMUSCULAR; INTRAVENOUS ONCE
Status: COMPLETED | OUTPATIENT
Start: 2019-09-23 | End: 2019-09-23

## 2019-09-23 RX ORDER — ONDANSETRON 2 MG/ML
4 INJECTION INTRAMUSCULAR; INTRAVENOUS ONCE
Status: CANCELLED | OUTPATIENT
Start: 2019-09-23 | End: 2019-09-23

## 2019-09-23 RX ORDER — MAGNESIUM SULFATE 100 %
4 CRYSTALS MISCELLANEOUS AS NEEDED
Status: DISCONTINUED | OUTPATIENT
Start: 2019-09-23 | End: 2019-09-23 | Stop reason: HOSPADM

## 2019-09-23 RX ORDER — ONDANSETRON 2 MG/ML
INJECTION INTRAMUSCULAR; INTRAVENOUS AS NEEDED
Status: DISCONTINUED | OUTPATIENT
Start: 2019-09-23 | End: 2019-09-23 | Stop reason: HOSPADM

## 2019-09-23 RX ORDER — DEXAMETHASONE SODIUM PHOSPHATE 4 MG/ML
INJECTION, SOLUTION INTRA-ARTICULAR; INTRALESIONAL; INTRAMUSCULAR; INTRAVENOUS; SOFT TISSUE AS NEEDED
Status: DISCONTINUED | OUTPATIENT
Start: 2019-09-23 | End: 2019-09-23 | Stop reason: HOSPADM

## 2019-09-23 RX ORDER — SUCCINYLCHOLINE CHLORIDE 20 MG/ML
INJECTION INTRAMUSCULAR; INTRAVENOUS AS NEEDED
Status: DISCONTINUED | OUTPATIENT
Start: 2019-09-23 | End: 2019-09-23 | Stop reason: HOSPADM

## 2019-09-23 RX ORDER — ROPIVACAINE HYDROCHLORIDE 5 MG/ML
30 INJECTION, SOLUTION EPIDURAL; INFILTRATION; PERINEURAL
Status: COMPLETED | OUTPATIENT
Start: 2019-09-23 | End: 2019-09-23

## 2019-09-23 RX ORDER — DEXTROSE 50 % IN WATER (D50W) INTRAVENOUS SYRINGE
25-50 AS NEEDED
Status: DISCONTINUED | OUTPATIENT
Start: 2019-09-23 | End: 2019-09-23 | Stop reason: HOSPADM

## 2019-09-23 RX ORDER — FENTANYL CITRATE 50 UG/ML
25 INJECTION, SOLUTION INTRAMUSCULAR; INTRAVENOUS
Status: CANCELLED | OUTPATIENT
Start: 2019-09-23

## 2019-09-23 RX ORDER — CEFAZOLIN SODIUM 2 G/50ML
2 SOLUTION INTRAVENOUS
Status: COMPLETED | OUTPATIENT
Start: 2019-09-23 | End: 2019-09-23

## 2019-09-23 RX ORDER — FAMOTIDINE 20 MG/1
20 TABLET, FILM COATED ORAL ONCE
Status: COMPLETED | OUTPATIENT
Start: 2019-09-23 | End: 2019-09-23

## 2019-09-23 RX ORDER — OXYCODONE AND ACETAMINOPHEN 5; 325 MG/1; MG/1
1-2 TABLET ORAL
Qty: 40 TAB | Refills: 0 | Status: SHIPPED | OUTPATIENT
Start: 2019-09-23 | End: 2019-09-30

## 2019-09-23 RX ORDER — PROPOFOL 10 MG/ML
INJECTION, EMULSION INTRAVENOUS AS NEEDED
Status: DISCONTINUED | OUTPATIENT
Start: 2019-09-23 | End: 2019-09-23 | Stop reason: HOSPADM

## 2019-09-23 RX ORDER — SODIUM CHLORIDE, SODIUM LACTATE, POTASSIUM CHLORIDE, CALCIUM CHLORIDE 600; 310; 30; 20 MG/100ML; MG/100ML; MG/100ML; MG/100ML
25 INJECTION, SOLUTION INTRAVENOUS CONTINUOUS
Status: DISCONTINUED | OUTPATIENT
Start: 2019-09-23 | End: 2019-09-23 | Stop reason: HOSPADM

## 2019-09-23 RX ORDER — FENTANYL CITRATE 50 UG/ML
INJECTION, SOLUTION INTRAMUSCULAR; INTRAVENOUS AS NEEDED
Status: DISCONTINUED | OUTPATIENT
Start: 2019-09-23 | End: 2019-09-23 | Stop reason: HOSPADM

## 2019-09-23 RX ORDER — GLYCOPYRROLATE 0.2 MG/ML
INJECTION INTRAMUSCULAR; INTRAVENOUS AS NEEDED
Status: DISCONTINUED | OUTPATIENT
Start: 2019-09-23 | End: 2019-09-23 | Stop reason: HOSPADM

## 2019-09-23 RX ORDER — INSULIN LISPRO 100 [IU]/ML
INJECTION, SOLUTION INTRAVENOUS; SUBCUTANEOUS ONCE
Status: DISCONTINUED | OUTPATIENT
Start: 2019-09-23 | End: 2019-09-23 | Stop reason: HOSPADM

## 2019-09-23 RX ORDER — LIDOCAINE HYDROCHLORIDE 10 MG/ML
2 INJECTION, SOLUTION EPIDURAL; INFILTRATION; INTRACAUDAL; PERINEURAL ONCE
Status: COMPLETED | OUTPATIENT
Start: 2019-09-23 | End: 2019-09-23

## 2019-09-23 RX ORDER — FENTANYL CITRATE 50 UG/ML
100 INJECTION, SOLUTION INTRAMUSCULAR; INTRAVENOUS ONCE
Status: COMPLETED | OUTPATIENT
Start: 2019-09-23 | End: 2019-09-23

## 2019-09-23 RX ORDER — HYDROMORPHONE HYDROCHLORIDE 1 MG/ML
0.5 INJECTION, SOLUTION INTRAMUSCULAR; INTRAVENOUS; SUBCUTANEOUS
Status: CANCELLED | OUTPATIENT
Start: 2019-09-23

## 2019-09-23 RX ORDER — SODIUM CHLORIDE 0.9 % (FLUSH) 0.9 %
5-40 SYRINGE (ML) INJECTION AS NEEDED
Status: CANCELLED | OUTPATIENT
Start: 2019-09-23

## 2019-09-23 RX ADMIN — DEXAMETHASONE SODIUM PHOSPHATE 4 MG: 4 INJECTION, SOLUTION INTRA-ARTICULAR; INTRALESIONAL; INTRAMUSCULAR; INTRAVENOUS; SOFT TISSUE at 09:38

## 2019-09-23 RX ADMIN — CEFAZOLIN 2 G: 10 INJECTION, POWDER, FOR SOLUTION INTRAVENOUS at 09:18

## 2019-09-23 RX ADMIN — SUCCINYLCHOLINE CHLORIDE 140 MG: 20 INJECTION INTRAMUSCULAR; INTRAVENOUS at 09:24

## 2019-09-23 RX ADMIN — GLYCOPYRROLATE 0.2 MG: 0.2 INJECTION INTRAMUSCULAR; INTRAVENOUS at 09:18

## 2019-09-23 RX ADMIN — FENTANYL CITRATE 100 MCG: 50 INJECTION INTRAMUSCULAR; INTRAVENOUS at 09:03

## 2019-09-23 RX ADMIN — SODIUM CHLORIDE, SODIUM LACTATE, POTASSIUM CHLORIDE, AND CALCIUM CHLORIDE 25 ML/HR: 600; 310; 30; 20 INJECTION, SOLUTION INTRAVENOUS at 08:50

## 2019-09-23 RX ADMIN — ONDANSETRON 4 MG: 2 INJECTION INTRAMUSCULAR; INTRAVENOUS at 10:40

## 2019-09-23 RX ADMIN — ROPIVACAINE HYDROCHLORIDE 150 MG: 5 INJECTION, SOLUTION EPIDURAL; INFILTRATION; PERINEURAL at 09:24

## 2019-09-23 RX ADMIN — ROCURONIUM BROMIDE 5 MG: 10 INJECTION, SOLUTION INTRAVENOUS at 09:24

## 2019-09-23 RX ADMIN — FENTANYL CITRATE 50 MCG: 50 INJECTION, SOLUTION INTRAMUSCULAR; INTRAVENOUS at 09:24

## 2019-09-23 RX ADMIN — LIDOCAINE HYDROCHLORIDE 2 ML: 10 INJECTION, SOLUTION EPIDURAL; INFILTRATION; INTRACAUDAL; PERINEURAL at 09:10

## 2019-09-23 RX ADMIN — PROPOFOL 200 MG: 10 INJECTION, EMULSION INTRAVENOUS at 09:24

## 2019-09-23 RX ADMIN — LIDOCAINE HYDROCHLORIDE 60 MG: 20 INJECTION, SOLUTION EPIDURAL; INFILTRATION; INTRACAUDAL; PERINEURAL at 09:24

## 2019-09-23 RX ADMIN — MIDAZOLAM HYDROCHLORIDE 2 MG: 2 INJECTION, SOLUTION INTRAMUSCULAR; INTRAVENOUS at 09:03

## 2019-09-23 RX ADMIN — FAMOTIDINE 20 MG: 20 TABLET ORAL at 08:50

## 2019-09-23 NOTE — BRIEF OP NOTE
BRIEF OPERATIVE NOTE    Date of Procedure: 9/23/2019   Preoperative Diagnosis: M75.121 RIGHT SHOULDER ROTATOR CUFF TEAR  M75.41 IMPINGEMENT  M75.21 BICEPS TENDINITIS  Postoperative Diagnosis: M75.121 RIGHT SHOULDER ROTATOR CUFF TEAR  M75.41 IMPINGEMENT  M75.21 BICEPS TENDINITIS    Procedure(s):  RIGHT SHOULDER ARTHROSCOPIC ROTATOR CUFF REPAIR/SUBACROMIAL DECOMPRESSION  Surgeon(s) and Role:     * Jeannette Murphy MD - Primary         Surgical Assistant: Astrid Michel, Charlene0 Gisele Loredo    Surgical Staff:  Circ-1: Edwin Badillo RN  Physician Assistant: IGNACIO Grove  Scrub Tech-1: Ann TRUJILLO  Surg Asst-1: Candi Collazo  Event Time In Time Out   Incision Start 4476    Incision Close       Anesthesia: General   Estimated Blood Loss: Minimal  Specimens: * No specimens in log *   Findings: Right rotator cuff tear, impingement   Complications: None  Implants:   Implant Name Type Inv.  Item Serial No.  Lot No. LRB No. Used Action   ANCHOR SUT FT CRKSCR 5.5MM -- BIOCOMPOSITE - [id number]  ANCHOR SUT FT CRKSCR 5.5MM -- BIOCOMPOSITE  [employer] [id number] 1 Implanted   ANCHOR BIOCOMP 4.75X19.1MM -- [facility] C-VENT - [id number]  ANCHOR BIOCOMP 4.75X19.1MM -- [facility] C-VENT  [employer] [id number] Right 1 Implanted

## 2019-09-23 NOTE — DISCHARGE INSTRUCTIONS
DISCHARGE SUMMARY from Nurse    PATIENT INSTRUCTIONS:    After general anesthesia or intravenous sedation, for 24 hours or while taking prescription Narcotics:  · Limit your activities  · Do not drive and operate hazardous machinery  · Do not make important personal or business decisions  · Do  not drink alcoholic beverages  · If you have not urinated within 8 hours after discharge, please contact your surgeon on call. Report the following to your surgeon:  · Excessive pain, swelling, redness or odor of or around the surgical area  · Temperature over 100.5  · Nausea and vomiting lasting longer than 4 hours or if unable to take medications  · Any signs of decreased circulation or nerve impairment to extremity: change in color, persistent  numbness, tingling, coldness or increase pain  · Any questions    *  Please give a list of your current medications to your Primary Care Provider. *  Please update this list whenever your medications are discontinued, doses are      changed, or new medications (including over-the-counter products) are added. *  Please carry medication information at all times in case of emergency situations. These are general instructions for a healthy lifestyle:    No smoking/ No tobacco products/ Avoid exposure to second hand smoke  Surgeon General's Warning:  Quitting smoking now greatly reduces serious risk to your health. Obesity, smoking, and sedentary lifestyle greatly increases your risk for illness    A healthy diet, regular physical exercise & weight monitoring are important for maintaining a healthy lifestyle    You may be retaining fluid if you have a history of heart failure or if you experience any of the following symptoms:  Weight gain of 3 pounds or more overnight or 5 pounds in a week, increased swelling in our hands or feet or shortness of breath while lying flat in bed.   Please call your doctor as soon as you notice any of these symptoms; do not wait until your next office visit. The discharge information has been reviewed with the patient/spouse. The patient and spouse verbalized understanding. Discharge medications reviewed with the patient and spouse and appropriate educational materials and side effects teaching were provided.   ___________________________________________________________________________________________________________________________________

## 2019-09-23 NOTE — ANESTHESIA POSTPROCEDURE EVALUATION
Procedure(s):  RIGHT SHOULDER ARTHROSCOPIC ROTATOR CUFF REPAIR/SUBACROMIAL DECOMPRESSION.     general, regional    Anesthesia Post Evaluation      Multimodal analgesia: multimodal analgesia used between 6 hours prior to anesthesia start to PACU discharge  Patient location during evaluation: bedside  Patient participation: complete - patient participated  Level of consciousness: awake  Pain management: adequate  Airway patency: patent  Anesthetic complications: no  Cardiovascular status: stable  Respiratory status: acceptable  Hydration status: acceptable  Post anesthesia nausea and vomiting:  controlled      Vitals Value Taken Time   /99 9/23/2019 12:33 PM   Temp 36.9 °C (98.5 °F) 9/23/2019 12:33 PM   Pulse 88 9/23/2019 12:33 PM   Resp 20 9/23/2019 12:33 PM   SpO2 99 % 9/23/2019 12:33 PM

## 2019-09-23 NOTE — INTERVAL H&P NOTE
H&P Update:  Yani Carroll was seen and examined. History and physical has been reviewed. The patient has been examined.  There have been no significant clinical changes since the completion of the originally dated History and Physical.

## 2019-09-24 ENCOUNTER — TELEPHONE (OUTPATIENT)
Dept: ORTHOPEDIC SURGERY | Facility: CLINIC | Age: 49
End: 2019-09-24

## 2019-09-24 DIAGNOSIS — G89.18 POST-OP PAIN: Primary | ICD-10-CM

## 2019-09-24 RX ORDER — TRAMADOL HYDROCHLORIDE 50 MG/1
100 TABLET ORAL
Qty: 60 TAB | Refills: 0 | Status: SHIPPED | OUTPATIENT
Start: 2019-09-24 | End: 2019-10-02

## 2019-09-24 NOTE — OP NOTES
[address] [place] [address]   OPERATIVE REPORT    Name:  Stef Mensah  MR#:   [id number]  :  1970  ACCOUNT #:  [de-identified]  DATE OF SERVICE:  2019    PREOPERATIVE DIAGNOSES:  1. Right shoulder rotator cuff tear. 2.  Right shoulder impingement. POSTOPERATIVE DIAGNOSES:  1. Right shoulder rotator cuff tear. 2.  Right shoulder impingement. PROCEDURE PERFORMED:  1. Right shoulder arthroscopic rotator cuff repair. 2.  Right shoulder arthroscopic subacromial decompression. SURGEON:  Fabrizio Palafox MD    ASSISTANT:  Crystal Benjamin PA-C MS. Crystal Benjamin PA-C MS, was medically necessary for the procedure. She assisted in patient positioning, placement of hardware, rotator cuff repair, wound closure, placement of dressing and sling as well as transfer of the patient to the PACU. ANESTHESIA:  General with nerve block. COMPLICATIONS:  None. SPECIMENS REMOVED:  None. IMPLANTS:  Arthrex suture anchor. ESTIMATED BLOOD LOSS:  Minimal.    IV FLUIDS:  600 mL of LR. INDICATIONS FOR PROCEDURE:  The patient is a 26-year-old female who presented to the office with right shoulder pain that failed conservative management. An MRI revealed a rotator cuff tear. After discussing the treatment options at length, she elected to undergo the procedure as described. DESCRIPTION OF THE PROCEDURE:  The patient was identified in the preoperative holding area. The right shoulder was marked as the operative extremity after confirmation with the patient. After discussing the risks and benefits of the procedure, informed consent was obtained. Anesthesia performed a nerve block in the preoperative holding area. The patient was then taken to the operating room. She was moved from the stretcher to the OR table. General anesthesia was induced and she was intubated. She was brought into the beach chair position. The right arm was prepped and draped in normal sterile fashion.   A time-out was performed. Antibiotics were given. The surgery began with a posterior portal placement through a stab incision. The arthroscope was brought into the glenohumeral joint. An anterior portal was made in the rotator interval under spinal needle localization. Some fraying but no full thickness tearing of the supraspinatus was noted. There was no tearing of the biceps tendon noted and the biceps anchor was intact. A tear in the supraspinatus tendon was noted intact with Ethibond suture. The scope was then brought into the subacromial space. A lateral portal was made under spinal needle localization as well as a posterolateral portal.  Subacromial decompression and subacromial bursectomy were performed. The rotator cuff tear of the supraspinatus leading edge was identified. This was debrided. The footprint was also debrided and a bur was used to complete the decompression as well as the debridement of the footprint of the supraspinatus. Through a stab incision off the anterolateral border of the acromion, a single double-loaded suture anchor was placed at the articular margin. The suture tails were passed through the supraspinatus tear taking care to avoid the labrum and biceps. These were then tied down. The suture tails were then brought through a lateral low anchor in the proximal lateral greater tuberosity completing the double row repair. Final pictures were taken. The scope and instruments were removed. The shoulder was drained. The portal sites were closed. A sterile dressing was placed about the right shoulder. The right arm was then placed into a sling after taking the drapes down. The patient was awakened from anesthesia, extubated, and taken to PACU in stable condition with a plan for discharge home.       MD RONAL Grewal/[id number]/  D:  09/23/2019 10:43  T:  09/23/2019 21:30  JOB #:  [id number]

## 2019-09-24 NOTE — TELEPHONE ENCOUNTER
Have her stop taking the medication she is on. Will prescribe tramadol since she has a N/V with norco. She can also take tylenol to help with pain.  She can  the rx at [facility] today

## 2019-09-24 NOTE — TELEPHONE ENCOUNTER
Called and spoke to patient making her aware there is an rx available at the [facility] office. rx placed at the .

## 2019-09-24 NOTE — TELEPHONE ENCOUNTER
Patient's mother, Corinne Villeda (on [address] Bur) called regarding the status of her daughter's prescription change.  She asked that we call the patient back at [phone]

## 2019-09-24 NOTE — TELEPHONE ENCOUNTER
Patient called to advised the pain medication that was prescribed to her post op yesterday is giving her shortness of breath and other trouble breathing. Patient asked if [facility] can prescribe something else. Routing high priority since it is regarding shortness of breath and sx was yesterday 9/23/19.      Patient can be reached at: [phone]

## 2019-09-26 ENCOUNTER — TELEPHONE (OUTPATIENT)
Dept: ORTHOPEDIC SURGERY | Facility: CLINIC | Age: 49
End: 2019-09-26

## 2019-09-26 NOTE — TELEPHONE ENCOUNTER
PT called and would like to be advised on how to changed her bandages correctly , states she attempted to do so but seen that it was stuck to her incision and didn't know if it was okay to still remove it.        Pt can be reached at : [phone]

## 2019-10-01 ENCOUNTER — OFFICE VISIT (OUTPATIENT)
Dept: ORTHOPEDIC SURGERY | Facility: CLINIC | Age: 49
End: 2019-10-01

## 2019-10-01 VITALS
HEART RATE: 98 BPM | BODY MASS INDEX: 43.27 KG/M2 | WEIGHT: 220.4 LBS | SYSTOLIC BLOOD PRESSURE: 167 MMHG | HEIGHT: 60 IN | RESPIRATION RATE: 18 BRPM | TEMPERATURE: 97.7 F | OXYGEN SATURATION: 100 % | DIASTOLIC BLOOD PRESSURE: 97 MMHG

## 2019-10-01 DIAGNOSIS — Z98.890 STATUS POST ARTHROSCOPY OF RIGHT SHOULDER: ICD-10-CM

## 2019-10-01 DIAGNOSIS — M75.121 NONTRAUMATIC COMPLETE TEAR OF RIGHT ROTATOR CUFF: ICD-10-CM

## 2019-10-01 DIAGNOSIS — M75.41 IMPINGEMENT SYNDROME OF RIGHT SHOULDER: ICD-10-CM

## 2019-10-01 DIAGNOSIS — G89.18 POST-OP PAIN: Primary | ICD-10-CM

## 2019-10-01 RX ORDER — ACETAMINOPHEN AND CODEINE PHOSPHATE 300; 30 MG/1; MG/1
1 TABLET ORAL
Qty: 28 TAB | Refills: 0 | Status: SHIPPED | OUTPATIENT
Start: 2019-10-01 | End: 2019-10-08

## 2019-10-01 RX ORDER — ACETAMINOPHEN 500 MG
TABLET ORAL
COMMUNITY

## 2019-10-01 NOTE — PROGRESS NOTES
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  -Adjuvant radiation therapy completed March 28, 2017.  -Endocrine therapy: Arimidex started March 30, 2017. She did not tolerate due to severe depression;  -Arimidex discontinued.   -Started on Femara after approximately 2 weeks, but did not tolerate Femara. Chose observation. Clinically, there is no evidence of recurrence. IA papillary serous endometrial adenocarcinoma, FIGO grade 3, - LVSI, tumor size 4.5 cm;    BRCA/Myrisk testing Negative  7/20/16-Exam under anesthesia, diagnostic laparoscopy, total laparoscopic hysterectomy, bilateral salpingo-oophorectomy, pelvic and periaortic lymphadenectomy, omentectomy. HDR VAGINAL BRACHYTHERAPY-9/29/16, 10/6/16, 10/13/16    On 9/21/2017 anterior right psoas muscle fine-needle aspirate: Positive for malignant cells, metastatic high-grade carcinoma compatible with patient's known endometrial serous carcinoma.     She completed 2 cycles of chemotherapy with Taxol Carbo and Avastin on 10/17 and 11/17. She had poor tolerance to chemotherapy, therefore she declined additional chemo and opted for Natural remedies instead     on 06/12/2018: 15 (<39UmL)      Re-staging scans on 06/15/2018:  CT of the abdomen and pelvis: 2.2 x 1.3 cm soft tissue nodule along the superior aspect of the urinary bladder suspicious for metastases, decreased in size since 1/23/18; no evidence of new abnormalities since prior visit; diverticulosis without evidence of diverticulitis.   CT Chest:  Multiple bilateral lung nodules suspicious for Ronni Forte  1970     HISTORY OF PRESENT ILLNESS  Ronni Forte is a 47 y.o. female who presents today for evaluation S/P Right shoulder arthroscopic rotator cuff repair and subacromial decompression on 9/23/19. Patient has not been going to PT. Describes pain as a 1/10. Has been taking tylneol for pain. Still has night pain. She reports tramadol upsetting her stomach and percocet causing shortness of breath. Her shoulder is doing well. She has been compliant with her sling and restrictions. Patient denies any fever, chills, chest pain, shortness of breath or calf pain. There are no new illness or injuries to report since last seen in the office. PHYSICAL EXAM:   Visit Vitals  BP (!) 167/97   Pulse 98   Temp 97.7 °F (36.5 °C) (Oral)   Resp 18   Ht 5' (1.524 m)   Wt 220 lb 6.4 oz (100 kg)   SpO2 100%   BMI 43.04 kg/m²      The patient is a well-developed, well-nourished female in no acute distress. The patient is alert and oriented times three. The patient appears to be well groomed. Mood and affect are normal.  ORTHOPEDIC EXAM of right shoulder:  Inspection: swelling not present,  Bruising present  Incision, clean, dry, intact, sutures in place  Passive glenohumeral abduction 0-20 degrees in the sling otherwise not assessed  Stability: Stable  Strength: n/a  2+ distal pulses    IMPRESSION:  S/P Right shoulder arthroscopic rotator cuff repair and subacromial decompression    PLAN:   Pt doing well post operatively  Incisions cleaned. Sutures removed and steri strips applied  Surgery was discussed at length today. Continue wearing sling until 4 weeks post op. Will start PT at next visit. Stressed to patient that nothing causes an increase in pain. Pt given a refill of pain medications today. Tylenol #3 Patient given pain medication for short term acute pain relief. Goal is to treat patient according to above plan and to ultimately have patient off all pain medications once appropriate.  If chronic pain management is required beyond what is expected for current orthopedic problem, will refer patient to pain management.   was reviewed and will be reviewed with every medication refill request.   RTC 3 weeks    Patient seen and evaluated by Dr. Derek Breaux today who agrees with treatment plan    [facility]  GERD (gastroesophageal reflux disease)     Gout     Hiatal hernia     Macular degeneration     Osteoarthritis      Medications:  Reviewed and reconciled. Allergies: Allergies   Allergen Reactions    Asa [Aspirin] Hives    Pepcid [Famotidine]     Prilosec [Omeprazole] Hives    Protonix [Pantoprazole Sodium] Other (See Comments)     Other reaction(s): Other: See Comments  DEPRESSION    Erythromycin Rash    Keflet [Cephalexin] Rash    Levaquin [Levofloxacin In D5w] Rash    Pcn [Penicillins] Rash    Polyethylene Glycol Rash     Bloating,gas    Tetracycline Rash    Tetracyclines & Related Rash     Physical Exam:  /65 (Site: Right Upper Arm, Position: Sitting, Cuff Size: Medium Adult)   Pulse 59   Temp 97.8 °F (36.6 °C) (Temporal)   Resp 20   Ht 5' 3.5\" (1.613 m)   Wt 158 lb 4.8 oz (71.8 kg)   BMI 27.60 kg/m²   GENERAL: Alert, oriented x 3, not in acute distress. HEENT: PERRLA; EOMI. Oropharynx clear. NECK: Supple. Without lymphadenopathy. LUNGS: Good air entry bilaterally. No wheezing, crackles or ronchi. CARDIOVASCULAR: Regular rate. No murmurs, rubs or gallops. ABDOMEN: Soft. Non-tender, non-distended. +BS  EXTREMITIES: Without clubbing, cyanosis, or edema. NEUROLOGIC: No focal deficits. 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  -Adjuvant radiation therapy completed March 28, 2017.  -Endocrine therapy: Arimidex started March 30, 2017. She did not tolerate due to severe depression;  -Arimidex discontinued.   -Started on Femara after approximately 2 weeks, but did not tolerate Femara. Chose observation. Bilateral screening mammogram on 10/03/2018 negative for malignancy  Clinically, there is no evidence of recurrence.      IA papillary serous endometrial

## 2019-10-02 ENCOUNTER — TELEPHONE (OUTPATIENT)
Dept: ORTHOPEDIC SURGERY | Facility: CLINIC | Age: 49
End: 2019-10-02

## 2019-10-02 NOTE — TELEPHONE ENCOUNTER
Spoke to Dr Unruly Bolaños was seen yesterday--Swelling is normal. Should keep BLE elevated to help with swelling.

## 2019-10-02 NOTE — TELEPHONE ENCOUNTER
Received call from patient advising 2 days after sx she had bilat ankle swelling. Patient advised she is wearing compression socks and its helping the swelling go down a little bit but they are still swollen. Patient wanted to speak to a nurse to confirm whether that is normal or not?  Sx was 9/23/19      Patient can be reached at: [phone]

## 2019-10-02 NOTE — TELEPHONE ENCOUNTER
Called and spoke to patient. Advised her that per Dr Leah Torres the swelling is normal. Advised that if she has any sharp pains and warmth in her calf, to call the office.

## 2019-10-22 ENCOUNTER — OFFICE VISIT (OUTPATIENT)
Dept: ORTHOPEDIC SURGERY | Facility: CLINIC | Age: 49
End: 2019-10-22

## 2019-10-22 VITALS
OXYGEN SATURATION: 98 % | RESPIRATION RATE: 16 BRPM | WEIGHT: 220 LBS | DIASTOLIC BLOOD PRESSURE: 86 MMHG | TEMPERATURE: 97.4 F | SYSTOLIC BLOOD PRESSURE: 152 MMHG | HEIGHT: 60 IN | HEART RATE: 83 BPM | BODY MASS INDEX: 43.19 KG/M2

## 2019-10-22 DIAGNOSIS — M75.121 NONTRAUMATIC COMPLETE TEAR OF RIGHT ROTATOR CUFF: Primary | ICD-10-CM

## 2019-10-22 DIAGNOSIS — M75.41 IMPINGEMENT SYNDROME OF RIGHT SHOULDER: ICD-10-CM

## 2019-10-22 DIAGNOSIS — Z98.890 STATUS POST ARTHROSCOPY OF RIGHT SHOULDER: ICD-10-CM

## 2019-10-22 RX ORDER — ACETAMINOPHEN AND CODEINE PHOSPHATE 300; 30 MG/1; MG/1
1 TABLET ORAL
COMMUNITY
End: 2020-07-10

## 2019-10-22 NOTE — PROGRESS NOTES
Leobardo Porter  1970     HISTORY OF PRESENT ILLNESS  Leobardo Porter is a 47 y.o. female who presents today for evaluation S/P Right shoulder arthroscopic rotator cuff repair and subacromial decompression on 9/23/19. Patient has not been going to PT. Describes pain as a 0/10. Has been taking tylneol for pain. Still has night pain. She reports tramadol upsetting her stomach and percocet causing shortness of breath. Her shoulder is doing well. She has been compliant with her sling and restrictions. Patient denies any fever, chills, chest pain, shortness of breath or calf pain. There are no new illness or injuries to report since last seen in the office. PHYSICAL EXAM:   Visit Vitals  /86 (BP 1 Location: Left arm, BP Patient Position: Sitting)   Pulse 83   Temp 97.4 °F (36.3 °C) (Oral)   Resp 16   Ht 5' (1.524 m)   Wt 220 lb (99.8 kg)   SpO2 98%   BMI 42.97 kg/m²      The patient is a well-developed, well-nourished female in no acute distress. The patient is alert and oriented times three. The patient appears to be well groomed. Mood and affect are normal.  ORTHOPEDIC EXAM of right shoulder:  Inspection: swelling not present,  Bruising present  Incision, clean, dry, intact, sutures in place  Passive glenohumeral abduction 0-20 degrees, 0 PER  Stability: Stable  Strength: n/a  2+ distal pulses    IMPRESSION:  S/P Right shoulder arthroscopic rotator cuff repair and subacromial decompression    PLAN:   Pt doing well post operatively  D/C sling today. Pt given exercises to do at home and order for PT with protocol attached. Passive movements only, talked about her restrictions. Stressed to patient that nothing causes an increase in pain. Pt not given a refill of pain medications today.    RTC 4 weeks    Patient seen and evaluated by Dr. Tiff Rivera today who agrees with treatment plan    [facility]

## 2019-10-22 NOTE — PROGRESS NOTES
1. Have you been to the ER, urgent care clinic since your last visit? Hospitalized since your last visit? NO    2. Have you seen or consulted any other health care providers outside of the [address] [place] [address] since your last visit? Include any pap smears or colon screening.    NO

## 2019-11-26 ENCOUNTER — APPOINTMENT (OUTPATIENT)
Dept: PHYSICAL THERAPY | Age: 49
End: 2019-11-26
Payer: OTHER GOVERNMENT

## 2019-11-26 ENCOUNTER — OFFICE VISIT (OUTPATIENT)
Dept: ORTHOPEDIC SURGERY | Facility: CLINIC | Age: 49
End: 2019-11-26

## 2019-11-26 VITALS
OXYGEN SATURATION: 99 % | HEART RATE: 101 BPM | DIASTOLIC BLOOD PRESSURE: 101 MMHG | HEIGHT: 60 IN | SYSTOLIC BLOOD PRESSURE: 179 MMHG | BODY MASS INDEX: 43.27 KG/M2 | RESPIRATION RATE: 18 BRPM | TEMPERATURE: 96.7 F | WEIGHT: 220.4 LBS

## 2019-11-26 DIAGNOSIS — M75.121 NONTRAUMATIC COMPLETE TEAR OF RIGHT ROTATOR CUFF: Primary | ICD-10-CM

## 2019-11-26 NOTE — PROGRESS NOTES
This note has been dictated below. Patient: James Chin                MRN: [id number]       SSN: [id number]  YOB: 1970        AGE: 47 y.o. SEX: female  Body mass index is 43.04 kg/m².     PCP: Maynor Connell PA-C  11/26/19    Past Medical History:   Diagnosis Date    Chest pain, unspecified 11/22/2013    resolved     Essential hypertension     Essential hypertension, benign 11/22/2013    resolved     Gout     Hypertension     Palpitations 11/22/2013    resolved     Shortness of breath 11/22/2013    resolved        Past Surgical History:   Procedure Laterality Date    HX HYSTERECTOMY      HX ORTHOPAEDIC      right knee- torn ligament       Family History   Problem Relation Age of Onset    Heart Attack Maternal Grandmother     Heart Attack Maternal Grandfather        Social History     Socioeconomic History    Marital status:      Spouse name: Not on file    Number of children: Not on file    Years of education: Not on file    Highest education level: Not on file   Occupational History    Not on file   Social Needs    Financial resource strain: Not on file    Food insecurity:     Worry: Not on file     Inability: Not on file    Transportation needs:     Medical: Not on file     Non-medical: Not on file   Tobacco Use    Smoking status: Never Smoker    Smokeless tobacco: Never Used   Substance and Sexual Activity    Alcohol use: No    Drug use: Never    Sexual activity: Yes   Lifestyle    Physical activity:     Days per week: Not on file     Minutes per session: Not on file    Stress: Not on file   Relationships    Social connections:     Talks on phone: Not on file     Gets together: Not on file     Attends [employer] service: Not on file     Active member of club or organization: Not on file     Attends meetings of clubs or organizations: Not on file     Relationship status: Not on file    Intimate partner violence:     Fear of current or ex partner: Not on file     Emotionally abused: Not on file     Physically abused: Not on file     Forced sexual activity: Not on file   Other Topics Concern    Not on file   Social History Narrative    Not on file       Current Outpatient Medications   Medication Sig Dispense Refill    telmisartan-hydroCHLOROthiazide (MICARDIS HCT) 40-12.5 mg per tablet Take 1 Tab by mouth daily. 30 Tab 2    cholecalciferol, vitamin D3, (VITAMIN D3) 2,000 unit tab Take  by mouth.  multivitamin (ONE A DAY) tablet Take 1 Tab by mouth daily.  acetaminophen-codeine (TYLENOL-CODEINE #3) 300-30 mg per tablet Take 1 Tab by mouth every four (4) hours as needed for Pain.  acetaminophen (TYLENOL EXTRA STRENGTH) 500 mg tablet Take  by mouth every six (6) hours as needed for Pain. Allergies   Allergen Reactions    Vicodin [Hydrocodone-Acetaminophen] Nausea and Vomiting         REVIEW OF SYSTEMS:      Review of systems unchanged from previous visit except as noted below. PHYSICAL EXAMINATION:  Visit Vitals  BP (!) 179/101   Pulse (!) 101   Temp 96.7 °F (35.9 °C) (Oral)   Resp 18   Ht 5' (1.524 m)   Wt 220 lb 6.4 oz (100 kg)   SpO2 99%   BMI 43.04 kg/m²     Body mass index is 43.04 kg/m². HISTORY OF PRESENT ILLNESS:  Corin Biswas returns to the office today for her right shoulder. She is two months out from her right shoulder rotator cuff repair and decompression. Overall, she is doing well in terms of pain, but she does have stiffness in the shoulder. She also reports some left shoulder pain occasionally when doing her exercises. PHYSICAL EXAM:  Exam of the right shoulder is notable for stiffness. She has only 10 - 15 degrees of external rotation with the arm at the side and 90 degrees of forward flexion. Her strength was not tested. ASSESSMENT AND PLAN:   Corin Biswas is now two months out from her right shoulder rotator cuff repair. She does have a little bit of stiffness.   I would like for her to aggressively work on her range of motion starting with physical therapy, which she is due to start tomorrow, as well as an aggressive home exercise program to continue. We will see her back in a month. She can start some strengthening of the rotator cuff as well in therapy, but I would like for them to primarily focus on range of motion of her shoulder.                Electronically signed by: Curtis Campbell MD

## 2019-11-27 ENCOUNTER — HOSPITAL ENCOUNTER (OUTPATIENT)
Dept: PHYSICAL THERAPY | Age: 49
Discharge: HOME | End: 2019-11-27
Payer: OTHER GOVERNMENT

## 2019-11-27 PROCEDURE — 97110 THERAPEUTIC EXERCISES: CPT

## 2019-11-27 PROCEDURE — 97162 PT EVAL MOD COMPLEX 30 MIN: CPT

## 2019-11-27 PROCEDURE — 97140 MANUAL THERAPY 1/> REGIONS: CPT

## 2019-11-27 NOTE — PROGRESS NOTES
PT DAILY TREATMENT NOTE/SHOULDER EVAL 10-18    Patient Name: Viktoria Guardado  Date:2019  : 1970  [x]  Patient  Verified  Payor:  / Plan: [facility]  ACTIVE DUTY AND DEPENDENTS / Product Type: [place] /    In time:1040  Out time:1120  Total Treatment Time (min): 40  Visit #: 1 of 18      Treatment Area: Complete rotator cuff tear or rupture of right shoulder, not specified as traumatic [M75.121]  Impingement syndrome of right shoulder [M75.41]  Other specified postprocedural states [Z98.890]    SUBJECTIVE  Pain Level (0-10 scale): 0/10  []constant []intermittent []improving []worsening []no change since onset    Any medication changes, allergies to medications, adverse drug reactions, diagnosis change, or new procedure performed?: [x] No    [] Yes (see summary sheet for update)  Subjective functional status/changes:     Patient is a 47year-old female s/p right shoulder arthroscopic rotator cuff repair with subacromial decompression on . Her sling was discontinued at her most recent follow up visit  Work Hx: not working   Living Situation: lives with family  Pt Goals: \" regain motion and lift without pain\"  Barriers: []pain [x]financial []time []transportation []other    OBJECTIVE/EXAMINATION    10 min [x]Eval                  []Re-Eval       10 min Therapeutic Exercise:  [] See flow sheet : HEP   Rationale: increase ROM and increase strength to improve the patients ability to increase ease of ADL's. 10 min Manual Therapy:  Right shoulder PROM with oscillations    Rationale: decrease pain, increase ROM and increase tissue extensibility to improve function of right UE and progress per protocol.             With   [x] TE   [] TA   [] neuro   [] other: Patient Education: [x] Review HEP    [] Progressed/Changed HEP based on:   [] positioning   [] body mechanics   [] transfers   [x] heat/ice application    [] other:      Other Objective/Functional Measures:     Physical Therapy Evaluation - Shoulder    Posture: [] Poor    [x] Fair    [] Good    Describe: rounded shoulders, right UT hiking    ROM:  [] Unable to assess at this time                                           AROM                                                              PROM   Left Right  Left Right   Flexion 175  Flexion  105   Extension   Extension     Scaption/ABD   Scaptin/ABD  115   ER @ 0 Degrees   ER @ 45 Degrees  15   ER @ 90 Degrees 85  ER @ 90 Degrees     IR @ 90 Degrees 65  IR @ 90 Degrees       End Feel / Painful Arc:    Strength:   [x] Unable to assess at this time                                                                            L (1-5) R (1-5) Pain   Flexors   [] Yes   [] No   Abductors   [] Yes   [] No   External Rotators   [] Yes   [] No   Internal Rotators   [] Yes   [] No   Supraspinatus   [] Yes   [] No   Serratus Anterior   [] Yes   [] No   Lower Trapezius   [] Yes   [] No   Elbow Flexion 4+/5 4/5 [] Yes   [x] No   Elbow Extension   [] Yes   [] No       Palpation  [] Min  [x] Mod  [] Severe    Location: right UT  [] Min  [] Mod  [] Severe    Location:  [] Min  [] Mod  [] Severe    Location:      Other Tests / Comments:   Moderate guarding during PROM      Pain Level (0-10 scale) post treatment: 0/10    ASSESSMENT/Changes in Function: see POC. Patient will continue to benefit from skilled PT services to modify and progress therapeutic interventions, address ROM deficits, address strength deficits and analyze and address soft tissue restrictions to attain remaining goals.      [x]  See Plan of Care  []  See progress note/recertification  []  See Discharge Summary         Progress towards goals / Updated goals:  See POC    PLAN  []  Upgrade activities as tolerated     [x]  Continue plan of care  []  Update interventions per flow sheet       []  Discharge due to:_  []  Other:_      Khalida Gupta, PT 11/27/2019  10:45 AM

## 2019-11-27 NOTE — PROGRESS NOTES
[facility]  [address] [place] [address]  [phone] fax    Plan of Care/ Statement of Necessity for Physical Therapy Services    Patient name: Maryjo Centeno Start of Care: 2019   Referral source: [place] Moon, [place] : 1970    Medical Diagnosis: Complete rotator cuff tear or rupture of right shoulder, not specified as traumatic [M75.121]  Impingement syndrome of right shoulder [M75.41]  Other specified postprocedural states [Z98.890]  Payor:  / Plan: [id number] DEPENDENTS / Product Type:  /  Onset Date:2019    Treatment Diagnosis: right shoulder pain    Prior Hospitalization: see medical history Provider#: [id number]   Medications: Verified on Patient summary List    Comorbidities: high blood pressure, arthritis, latex allergy    Prior Level of Function: functionally independent, right handed     The Plan of Care and following information is based on the information from the initial evaluation. Assessment/ key information: Patient is a 47year-old female s/p right shoulder arthroscopic rotator cuff repair with subacromial decompression on . Her sling was discontinued at her most recent follow up visit. Pt has clean bandage covering arthroscopic incisions. She presents with decreased right shoulder PROM with empty end feel  and moderate guarding; flexion to 105 degrees, scaption 115 degrees, and ER 15 degrees. Mild TTP along right UT and periscapular musculature. She has fair posture; rounded shoulders and increased right UT elevation. Skilled PT is necessary to address ROM, flexibility, and strength in order to restore ease of ADL's and improve QOL.      Evaluation Complexity History MEDIUM  Complexity : 1-2 comorbidities / personal factors will impact the outcome/ POC ; Examination MEDIUM Complexity : 3 Standardized tests and measures addressing body structure, function, activity limitation and / or participation in recreation  ;Presentation MEDIUM Complexity : Evolving with changing characteristics  ; Clinical Decision Making MEDIUM Complexity : FOTO score of 26-74  Overall Complexity Rating: MEDIUM  Problem List: pain affecting function, decrease ROM, decrease strength, edema affecting function, decrease ADL/ functional abilitiies, decrease activity tolerance and decrease flexibility/ joint mobility   Treatment Plan may include any combination of the following: Therapeutic exercise, Therapeutic activities, Neuromuscular re-education, Physical agent/modality, Manual therapy and Patient education  Patient / Family readiness to learn indicated by: trying to perform skills  Persons(s) to be included in education: patient (P)  Barriers to Learning/Limitations: None  Patient Goal (s): regain mobility/strength in right shoulder and lift without pain\"  Patient Self Reported Health Status: good  Rehabilitation Potential: good    Short Term Goals: To be accomplished in 1 weeks:  1. Patient will be compliant with HEP in ord to imprerove therapy outcomes. Long Term Goals: To be accomplished in 6 weeks:  1. Patient will restore right shoulder PROM to WNL to improve function and progress per protocol. 2. Patient will increase FOTO score to 66/100 indicating improvement in functional mobility and QOL. 3. Patient will achieve 150 degrees shoulder flexion/scaption AROM in order to increase ease of ADL's  4. Patient will report < 2/10 pain with daily activities in order to improve QOL. Frequency / Duration: Patient to be seen 2-3 times per week for 4 weeks.     Patient/ CarPatient/ Caregiver education and instruction: Diagnosis, prognosis, activity modification and exercises   [x]  Plan of care has been reviewed with WARREN Whaley, PT 11/27/2019 12:08 PM    ________________________________________________________________________    I certify that the above Therapy Services are being furnished while the patient is under my care. I agree with the treatment plan and certify that this therapy is necessary.     Physician's Signature:____________Date:_________TIME:________    ** Signature, Date and Time must be completed for valid certification **    Please sign and return to [facility]  [address] [facility]  [phone] fax

## 2019-12-04 ENCOUNTER — HOSPITAL ENCOUNTER (OUTPATIENT)
Dept: PHYSICAL THERAPY | Age: 49
Discharge: HOME | End: 2019-12-04
Payer: OTHER GOVERNMENT

## 2019-12-04 PROCEDURE — 97110 THERAPEUTIC EXERCISES: CPT

## 2019-12-04 PROCEDURE — 97140 MANUAL THERAPY 1/> REGIONS: CPT

## 2019-12-04 NOTE — PROGRESS NOTES
PT DAILY TREATMENT NOTE 10-18    Patient Name: Iraj Mcgraw  Date:2019  : 1970  [x]  Patient  Verified  Payor:  / Plan: [facility]  ACTIVE DUTY AND DEPENDENTS / Product Type:  /    In time:206  Out time:235  Total Treatment Time (min): 29  Visit #: 1 of 12    Medicare/BCBS Only   Total Timed Codes (min):  29 1:1 Treatment Time:  29       Treatment Area: Complete rotator cuff tear or rupture of right shoulder, not specified as traumatic [M75.121]  Impingement syndrome of right shoulder [M75.41]  Other specified postprocedural states [Z98.890]    SUBJECTIVE  Pain Level (0-10 scale): 0/10  Any medication changes, allergies to medications, adverse drug reactions, diagnosis change, or new procedure performed?: [x] No    [] Yes (see summary sheet for update)  Subjective functional status/changes:   [] No changes reported  Patient states she is sore. She is trying to sleep in the bed as opposed to lounge chair.      OBJECTIVE    Modality rationale: PD   Min Type Additional Details    [] Estim:  []Unatt       []IFC  []Premod                        []Other:  []w/ice   []w/heat  Position:  Location:    [] Estim: []Att    []TENS instruct  []NMES                    []Other:  []w/US   []w/ice   []w/heat  Position:  Location:    []  Traction: [] Cervical       []Lumbar                       [] Prone          []Supine                       []Intermittent   []Continuous Lbs:  [] before manual  [] after manual    []  Ultrasound: []Continuous   [] Pulsed                           []1MHz   []3MHz W/cm2:  Location:    []  Iontophoresis with dexamethasone         Location: [] Take home patch   [] In clinic    []  Ice     []  heat  []  Ice massage  []  Laser   []  Anodyne Position:  Location:    []  Laser with stim  []  Other:  Position:  Location:    []  Vasopneumatic Device Pressure:       [] lo [] med [] hi   Temperature: [] lo [] med [] hi   [] Skin assessment post-treatment:  []intact []redness- no adverse reaction    []redness - adverse reaction:       19 min Therapeutic Exercise:  [x] See flow sheet :   Rationale: increase ROM and increase strength to improve the patients ability to increase ease of ADL's. 10 min Manual Therapy:  right shoulder PROM with oscillations    Rationale: decrease pain, increase ROM and increase tissue extensibility to improve functional use of right UE. With   [] TE   [] TA   [] neuro   [] other: Patient Education: [x] Review HEP    [] Progressed/Changed HEP based on:   [] positioning   [] body mechanics   [] transfers   [] heat/ice application    [] other:      Other Objective/Functional Measures:   No significant increase pain with pulleys  Moderate guarding at end range limited PROM   band aide covering fully healed incisions pt states it is uncomfortable when her bra strap rubs on scars. Pain Level (0-10 scale) post treatment: 0/10    ASSESSMENT/Changes in Function: Initiated Rx per POC. Pt demonstrates fair tolerance to PROM with empty end feel. Will continue to focus on restoring PROM in order to improve shoulder function    Patient will continue to benefit from skilled PT services to modify and progress therapeutic interventions, address functional mobility deficits, address ROM deficits, address strength deficits and analyze and address soft tissue restrictions to attain remaining goals. [x]  See Plan of Care  []  See progress note/recertification  []  See Discharge Summary         Progress towards goals / Updated goals:  1. Patient will be compliant with HEP in ord to imprerove therapy outcomes. Long Term Goals: To be accomplished in 6 weeks:  1. Patient will restore right shoulder PROM to WNL to improve function and progress per protocol. 2. Patient will increase FOTO score to 66/100 indicating improvement in functional mobility and QOL. 3. Patient will achieve 150 degrees shoulder flexion/scaption AROM in order to increase ease of ADL's  4. Patient will report < 2/10 pain with daily activities in order to improve QOL.         PLAN  []  Upgrade activities as tolerated     [x]  Continue plan of care  []  Update interventions per flow sheet       []  Discharge due to:_  []  Other:_      Harley Shaffer, PT 12/4/2019  1:58 PM    Future Appointments   Date Time Provider Gilbert Kelsey   12/4/2019  2:00 PM Thuan Person, PT [id number] [facility]   12/9/2019  9:30 AM Alexis Seals, PT [id number] [facility]   12/16/2019  9:30 AM Alexis Seals, PT [id number] [facility]   12/24/2019  1:00 PM Deni Casillas MD [facility]   12/27/2019  2:30 PM Delta Munoz, PT [id number] [facility]

## 2019-12-09 ENCOUNTER — APPOINTMENT (OUTPATIENT)
Dept: PHYSICAL THERAPY | Age: 49
End: 2019-12-09
Payer: OTHER GOVERNMENT

## 2019-12-12 ENCOUNTER — HOSPITAL ENCOUNTER (OUTPATIENT)
Dept: PHYSICAL THERAPY | Age: 49
Discharge: HOME | End: 2019-12-12
Payer: OTHER GOVERNMENT

## 2019-12-12 PROCEDURE — 97140 MANUAL THERAPY 1/> REGIONS: CPT

## 2019-12-12 PROCEDURE — 97110 THERAPEUTIC EXERCISES: CPT

## 2019-12-12 NOTE — PROGRESS NOTES
PT DAILY TREATMENT NOTE 10-18    Patient Name: Sumeet Pelaez  Date:2019  : 1970  [x]  Patient  Verified  Payor:  / Plan: [facility]  ACTIVE DUTY AND DEPENDENTS / Product Type:  /    In time: 5:00  Out time: 5:31  Total Treatment Time (min): 31  Visit #: 3 of     Treatment Area: Impingement syndrome of right shoulder [M75.41]  Pain in right shoulder [M25.511]  Complete rotator cuff tear or rupture of right shoulder, not specified as traumatic [M75.121]    SUBJECTIVE  Pain Level (0-10 scale): 1/10  Any medication changes, allergies to medications, adverse drug reactions, diagnosis change, or new procedure performed?: [x] No    [] Yes (see summary sheet for update)  Subjective functional status/changes:   [] No changes reported  Pt. Reports she is doing pretty good. She reports she has been doing her HEP and only has some soreness. OBJECTIVE    23 min Therapeutic Exercise:  [x] See flow sheet :   Rationale: increase ROM and increase strength to improve the patients ability to increase ease of ADLs    8 min Manual Therapy:  scap mobs   Rationale: decrease pain, increase ROM and increase tissue extensibility to increase ease of ADLs          With   [x] TE   [] TA   [] neuro   [] other: Patient Education: [x] Review HEP    [] Progressed/Changed HEP based on:   [] positioning   [] body mechanics   [] transfers   [] heat/ice application    [] other:      Other Objective/Functional Measures:   Pt. PROM flexion was limited to about 90 degrees with firm end feel and mild pain  Poor mobility during pulleys  She was educated on table slides for HEP in order to improve mobility, she was also educated on getting pulleys for home       Pain Level (0-10 scale) post treatment: 0/10    ASSESSMENT/Changes in Function: pt. Is making limited progress towards goals. She continues to have decreased right shoulder mobility and strength. She reports continued compliance with her HEP.      Patient will continue to benefit from skilled PT services to modify and progress therapeutic interventions, address functional mobility deficits, address ROM deficits, address strength deficits, analyze and address soft tissue restrictions, analyze and cue movement patterns, analyze and modify body mechanics/ergonomics and assess and modify postural abnormalities to attain remaining goals. Progress towards goals / Updated goals:  1. Patient will be compliant with HEP in ord to imprerove therapy outcomes. Met (12/13/19)    Long Term Goals: To be accomplished in 6 weeks:  1. Patient will restore right shoulder PROM to WNL to improve function and progress per protocol.   2. Patient will increase FOTO score to 66/100 indicating improvement in functional mobility and QOL.    3. Patient will achieve 150 degrees shoulder flexion/scaption AROM in order to increase ease of ADL's  4. Patient will report < 2/10 pain with daily activities in order to improve QOL.       PLAN  []  Upgrade activities as tolerated     [x]  Continue plan of care  []  Update interventions per flow sheet       []  Discharge due to:_  []  Other:_      Anselmo Winston, PT 12/12/2019  3:35 PM    Future Appointments   Date Time Provider Gilbert Cole   12/12/2019  5:00 PM Nadine Ulloa, PT [id number] [facility]   12/16/2019  9:30 AM Harley Main PT [id number] [facility]   12/18/2019  3:00 PM Nadine Ulloa, PT [id number] [facility]   12/23/2019  2:30 PM Nadine Ulloa, PT [id number] [facility]   12/24/2019  1:00 PM Gretchen Harris MD [facility] YOLI SCHED   12/27/2019  2:30 PM Nadine Ulloa, PT [id number] [facility]

## 2019-12-16 ENCOUNTER — HOSPITAL ENCOUNTER (OUTPATIENT)
Dept: PHYSICAL THERAPY | Age: 49
Discharge: HOME | End: 2019-12-16
Payer: OTHER GOVERNMENT

## 2019-12-16 PROCEDURE — 97110 THERAPEUTIC EXERCISES: CPT

## 2019-12-16 PROCEDURE — 97140 MANUAL THERAPY 1/> REGIONS: CPT

## 2019-12-16 NOTE — PROGRESS NOTES
PT DAILY TREATMENT NOTE - [facility]     Patient Name: Avtar Orourke  Date:2019  : 1970  [x]  Patient  Verified  Payor:  / Plan: [facility]  ACTIVE DUTY AND DEPENDENTS / Product Type:  /    In time:932  Out time:1008  Total Treatment Time (min): 36    Visit #: 4 of 12    Treatment Area: Other specified postprocedural states [Z98.890]  Pain in right shoulder [M25.511]  Complete rotator cuff tear or rupture of right shoulder, not specified as traumatic [M75.121]    SUBJECTIVE  Pain Level (0-10 scale): 1/10  Any medication changes, allergies to medications, adverse drug reactions, diagnosis change, or new procedure performed?: [x] No    [] Yes (see summary sheet for update)  Subjective functional status/changes:   [] No changes reported  Reports she is mostly stiffness, no pain. Stiffness mostly after driving. OBJECTIVE    28 min Therapeutic Exercise:  [] See flow sheet :   Rationale: increase ROM to improve the patients ability to ease with ADL's.     8 min Manual Therapy:  Shoulder iso, PROM, scap mobs. Rationale: decrease pain, increase ROM and decrease trigger points to ease with ADL's          With   [] TE   [] TA   [] neuro   [] other: Patient Education: [x] Review HEP    [] Progressed/Changed HEP based on:   [] positioning   [] body mechanics   [] transfers   [] heat/ice application    [] other:      Other Objective/Functional Measures: iso flexion and extension increased discomfort  Requires cueing for decreasing UT compensation. Introduced LS stretch. Requires cueing for most ex's for correction to decrease compensation  Pain Level (0-10 scale) post treatment: 0/10    ASSESSMENT/Changes in Function: Progressing well. Pt educated on pendulums, UT/ LS stretch, and decreasing shoulder hiking during elevation. Pt educated on importance of HEP to progress per protocol.      Patient will continue to benefit from skilled PT services to modify and progress therapeutic interventions, address functional mobility deficits, address ROM deficits, address strength deficits, analyze and address soft tissue restrictions, analyze and cue movement patterns, analyze and modify body mechanics/ergonomics and assess and modify postural abnormalities to attain remaining goals. [x]  See Plan of Care  []  See progress note/recertification  []  See Discharge Summary         Progress towards goals / Updated goals:  1. Patient will be compliant with HEP in ord to imprerove therapy outcomes. Met (12/13/19)     Long Term Goals: To be accomplished in 6 weeks:  1. Patient will restore right shoulder PROM to WNL to improve function and progress per protocol.   2. Patient will increase FOTO score to 66/100 indicating improvement in functional mobility and QOL.    3. Patient will achieve 150 degrees shoulder flexion/scaption AROM in order to increase ease of ADL's  4. Patient will report < 2/10 pain with daily activities in order to improve QOL.          PLAN  [x]  Upgrade activities as tolerated     [x]  Continue plan of care  []  Update interventions per flow sheet       []  Discharge due to:_  []  Other:_      Ha Miles PT 12/16/2019  8:30 AM    Future Appointments   Date Time Provider Gilbert Cole   12/16/2019  9:30 AM  [place], PT [id number] [facility]   12/18/2019  3:00 PM Liliane Sanchez PT [id number] [facility]   12/23/2019  2:30 PM Liliane Sanchez PT [id number] [facility]   12/24/2019  1:00 PM Sheela Burgos MD [facility]   12/27/2019  2:30 PM Liliane Sanchez PT [id number] [facility]

## 2019-12-18 ENCOUNTER — HOSPITAL ENCOUNTER (OUTPATIENT)
Dept: PHYSICAL THERAPY | Age: 49
Discharge: HOME | End: 2019-12-18
Payer: OTHER GOVERNMENT

## 2019-12-18 PROCEDURE — 97110 THERAPEUTIC EXERCISES: CPT

## 2019-12-18 PROCEDURE — 97140 MANUAL THERAPY 1/> REGIONS: CPT

## 2019-12-18 NOTE — PROGRESS NOTES
PT DAILY TREATMENT NOTE 10-18    Patient Name: Pattie Carpenter  Date:2019  : 1970  [x]  Patient  Verified  Payor:  / Plan: [facility]  ACTIVE DUTY AND DEPENDENTS / Product Type:  /    In time:  3:07  Out time: 3:36  Total Treatment Time (min): 29  Visit #: 5 of 12    Treatment Area: Other specified postprocedural states [Z98.890]  Pain in right shoulder [M25.511]  Complete rotator cuff tear or rupture of right shoulder, not specified as traumatic [M75.121]    SUBJECTIVE  Pain Level (0-10 scale): 1/10  Any medication changes, allergies to medications, adverse drug reactions, diagnosis change, or new procedure performed?: [x] No    [] Yes (see summary sheet for update)  Subjective functional status/changes:   [] No changes reported  Pt. Reports she is doing pretty good today. She reports she continues to feel stiff. OBJECTIVE    19 min Therapeutic Exercise:  [x] See flow sheet :   Rationale: increase ROM and increase strength to improve the patients ability to increase ease of ADLs    10 min Manual Therapy:  scap mobs, trigger point release infraspinatus, subscap, and pec major   Rationale: decrease pain, increase ROM and increase tissue extensibility to increase ease of ADLs          With   [x] TE   [] TA   [] neuro   [] other: Patient Education: [x] Review HEP    [] Progressed/Changed HEP based on:   [] positioning   [] body mechanics   [] transfers   [] heat/ice application    [] other:      Other Objective/Functional Measures:   Pt. Required cues to perform stick AAROM correctly  She continues to have limited PROM in all directions with firm end feels  Pt. Was educated on pec trigger point release to increase ease of laying down     Pain Level (0-10 scale) post treatment: 0/10    ASSESSMENT/Changes in Function: pt. Is progressing slowly towards goals. She continues to have decreased right shoulder PROM with firm end feels.  She also continues to have decreased strength and limited AAROM mobility. Patient will continue to benefit from skilled PT services to modify and progress therapeutic interventions, address functional mobility deficits, address ROM deficits, address strength deficits, analyze and address soft tissue restrictions, analyze and cue movement patterns, analyze and modify body mechanics/ergonomics and assess and modify postural abnormalities to attain remaining goals. Progress towards goals / Updated goals:  1. Patient will be compliant with HEP in ord to imprerove therapy outcomes. Met (19)     Long Term Goals: To be accomplished in 6 weeks:  1. Patient will restore right shoulder PROM to WNL to improve function and progress per protocol.   2. Patient will increase FOTO score to 66/100 indicating improvement in functional mobility and QOL.    3. Patient will achieve 150 degrees shoulder flexion/scaption AROM in order to increase ease of ADL's  4. Patient will report < 2/10 pain with daily activities in order to improve QOL.     Progressin/10 today (19)    PLAN  []  Upgrade activities as tolerated     [x]  Continue plan of care  []  Update interventions per flow sheet       []  Discharge due to:_  []  Other:_      Leda Tapia, PT 2019  3:07 PM    Future Appointments   Date Time Provider Gilbert Cole   2019  2:30 PM Patrice Brown PT [id number] [facility]   2019  1:00 PM Kathy Winters MD [facility] YOLI YATES   2019  2:30 PM Patrice Brown PT [id number] [facility]

## 2019-12-23 ENCOUNTER — HOSPITAL ENCOUNTER (OUTPATIENT)
Dept: PHYSICAL THERAPY | Age: 49
Discharge: HOME | End: 2019-12-23
Payer: OTHER GOVERNMENT

## 2019-12-23 PROCEDURE — 97110 THERAPEUTIC EXERCISES: CPT

## 2019-12-23 PROCEDURE — 97140 MANUAL THERAPY 1/> REGIONS: CPT

## 2019-12-23 NOTE — PROGRESS NOTES
PT DAILY TREATMENT NOTE 10-18    Patient Name: Messi Solano  Date:2019  : 1970  [x]  Patient  Verified  Payor:  / Plan: [facility]  ACTIVE DUTY AND DEPENDENTS / Product Type:  /    In time: 2:25  Out time: 3:00  Total Treatment Time (min): 35  Visit #: 6 of 12    Treatment Area: Other specified postprocedural states [Z98.890]  Pain in right shoulder [M25.511]  Complete rotator cuff tear or rupture of right shoulder, not specified as traumatic [M75.121]    SUBJECTIVE  Pain Level (0-10 scale):  0/10  Any medication changes, allergies to medications, adverse drug reactions, diagnosis change, or new procedure performed?: [x] No    [] Yes (see summary sheet for update)  Subjective functional status/changes:   [] No changes reported  Pt. Reports she is feeling about the same today. She reports sleeping is a little better but she still has to sleep sitting up. OBJECTIVE    27 min Therapeutic Exercise:  [x] See flow sheet :   Rationale: increase ROM and increase strength to improve the patients ability to incrase ease of ADLs    8 min Manual Therapy:   scap mobs, subscap release   Rationale: decrease pain, increase ROM and increase tissue extensibility to increase ease of ADLs          With   [x] TE   [] TA   [] neuro   [] other: Patient Education: [x] Review HEP    [] Progressed/Changed HEP based on:   [] positioning   [] body mechanics   [] transfers   [] heat/ice application    [] other:      Other Objective/Functional Measures:   Right shoulder PROM flex: 95 degrees scaption: 100 degrees   AAROM on pulleys flexion and scaption ~90 degrees  Educated on side lying flexion and ER for HEP    Pain Level (0-10 scale) post treatment:  0/10    ASSESSMENT/Changes in Function:  Pt. Is progressing slowly towards goals. She is having less pain overall and increased ease of sleeping. She has also initiated AROM activities.  She continues to demonstrates decreased right shoulder PROM with firm end feel. She was educated on increasing frequency of ROM exercises at home to help improve her flexiblity. Patient will continue to benefit from skilled PT services to modify and progress therapeutic interventions, address functional mobility deficits, address ROM deficits, address strength deficits, analyze and address soft tissue restrictions and analyze and cue movement patterns to attain remaining goals. Progress towards goals / Updated goals:  1. Patient will be compliant with HEP in ord to imprerove therapy outcomes. Met (19)     Long Term Goals: To be accomplished in 6 weeks:  1. Patient will restore right shoulder PROM to WNL to improve function and progress per protocol. Not met: flex: 95 degrees scaption: 100 degrees (19)   2. Patient will increase FOTO score to 66/100 indicating improvement in functional mobility and QOL.    3. Patient will achieve 150 degrees shoulder flexion/scaption AROM in order to increase ease of ADL's  4. Patient will report < 2/10 pain with daily activities in order to improve QOL.     Progressin/10 today (19)       PLAN  []  Upgrade activities as tolerated     [x]  Continue plan of care  []  Update interventions per flow sheet       []  Discharge due to:_  []  Other:_      Ailyn Chavarria PT 2019  2:17 PM    Future Appointments   Date Time Provider Gilbert Cole   2019  2:30 PM Aaron Carroll PT MMCPTPB [facility]   2019  1:00 PM Levon Dotson MD [facility] SCHED   2019  2:30 PM Aaron Carroll PT [id number] [facility]

## 2019-12-27 ENCOUNTER — HOSPITAL ENCOUNTER (OUTPATIENT)
Dept: PHYSICAL THERAPY | Age: 49
Discharge: HOME | End: 2019-12-27
Payer: OTHER GOVERNMENT

## 2019-12-27 PROCEDURE — 97110 THERAPEUTIC EXERCISES: CPT

## 2019-12-27 PROCEDURE — 97140 MANUAL THERAPY 1/> REGIONS: CPT

## 2019-12-27 NOTE — PROGRESS NOTES
PT DAILY TREATMENT NOTE 10-18    Patient Name: Marcelino Parra  Date:2019  : 1970  [x]  Patient  Verified  Payor:  / Plan: [facility]  ACTIVE DUTY AND DEPENDENTS / Product Type:  /    In time: 2:30  Out time: 3:11  Total Treatment Time (min): 41  Visit #: 7 of 12    Treatment Area: Other specified postprocedural states [Z98.890]  Pain in right shoulder [M25.511]  Complete rotator cuff tear or rupture of right shoulder, not specified as traumatic [M75.121]    SUBJECTIVE  Pain Level (0-10 scale):  0/10  Any medication changes, allergies to medications, adverse drug reactions, diagnosis change, or new procedure performed?: [x] No    [] Yes (see summary sheet for update)  Subjective functional status/changes:   [] No changes reported   pt. Reports she is doing about the same. She reports doing her ROM exercises more often at home. OBJECTIVE    26 min Therapeutic Exercise:  [x] See flow sheet :   Rationale: increase ROM and increase strength to improve the patients ability to increase ease of ADLs    15 min Manual Therapy:  scap mobs, trigger point release to infraspinatus and subscap   Rationale: decrease pain, increase ROM and increase tissue extensibility to increase ease of reaching          With   [x] TE   [] TA   [] neuro   [] other: Patient Education: [x] Review HEP    [] Progressed/Changed HEP based on:   [] positioning   [] body mechanics   [] transfers   [] heat/ice application    [] other:      Other Objective/Functional Measures: FOTO: 52 points  Right shoulder PROM flex: 106 degrees after manual: flex: 110 degres scap: 111 degrees  Pt.  Tolerated PT well with no reports of increased pain    Pain Level (0-10 scale) post treatment: 0/10    ASSESSMENT/Changes in Function:  Progress towards goals / Updated goals:  See progress note    PLAN  []  Upgrade activities as tolerated     [x]  Continue plan of care  []  Update interventions per flow sheet       []  Discharge due to:_  []  Other:_      Kale Chavarria, PT 12/27/2019  8:55 AM    Future Appointments   Date Time Provider Gilbert Cole   12/27/2019  2:30 PM Randy Vasquez MMCPTPB [facility]   1/6/2020  3:30 PM Jhoan Archuleta PT MMCPTPB [facility]   1/7/2020  4:10 PM Jamar Vogt MD [facility]   1/10/2020 12:00 PM Kamlesh Yeager, LISETTE CNTHMMO [facility]   1/14/2020  9:30 AM Kamlesh Yeager PT GPAIFCY [facility]   1/17/2020 10:30 AM Jhoan Archuleta PT MMCPTPB [facility]

## 2019-12-27 NOTE — PROGRESS NOTES
[facility] - [facility]  [address] [place] [address]  [phone] fax    Physical Therapy Progress Note  Patient name: Rickey Gil Start of Care: 2019   Referral source: Wesley Macdonald, [address] : 1970                Medical Diagnosis: Complete rotator cuff tear or rupture of right shoulder, not specified as traumatic [M75.121]  Impingement syndrome of right shoulder [M75.41]  Other specified postprocedural states [Z98.890]  Payor: [employer] / Plan: [address] DEPENDENTS / Product Type: [employer] /  Onset Date:2019                Treatment Diagnosis: right shoulder pain    Prior Hospitalization: see medical history Provider#: [id number]   Medications: Verified on Patient summary List    Comorbidities: high blood pressure, arthritis, latex allergy    Prior Level of Function: functionally independent, right handed     Visits from Start of Care: 7    Missed Visits: 0    Established Goals:         Excellent           Good         Limited           None  [x] Increased ROM   []  []  [x]  []  [x] Increased Strength  []  []  [x]  []  [x] Increased Mobility  []  []  [x]  []   [x] Decreased Pain   []  [x]  []  []  [] Decreased Swelling  []  []  []  []    Key Functional Changes:  Pt. Is progressing slowly towards goals. She continues to have decreased right shoulder PROM with firm end feel but it is slowly improving. Her mobility improved following trigger point release to massage to infraspinatus and subscapularis. Right shoulder flexion PROM is 110 degrees and scaption is 111 degrees. She reports having less pain overall and is improving despite decrease in FOTO score to 52 points. Skilled PT is medically necessary in order to improve right shoulder mobility and strength for increased ease of ADLs and improved quality of life. Updated Goals: to be achieved in 4 weeks:  1.  Patient will improve FOTO score by 3 points in order to demonstrate a significant improvement in function. 2. Patient will improve right shoulder PROM flex/abd to 135 degrees in order to increase ease of reaching into cabinets at home. 3. Patient will improve right shoulder AROM flex/abd to 120 degrees in order to increase ease of ADLs. 4. Patient will improve behind back reaching to L4 in order to increase ease of getting dressed. ASSESSMENT/RECOMMENDATIONS:  [x]Continue therapy per initial plan/protocol at a frequency of  2 x per week for 4 weeks  []Continue therapy with the following recommended changes:_____________________      _____________________________________________________________________  []Discontinue therapy progressing towards or have reached established goals  []Discontinue therapy due to lack of appreciable progress towards goals  []Discontinue therapy due to lack of attendance or compliance  []Await Physician's recommendations/decisions regarding therapy  []Other:________________________________________________________________    Thank you for this referral.   Jolly Benjamin, PT 12/27/2019 3:03 PM    NOTE TO PHYSICIAN:  [address]   FAX TO [facility]: [phone]  If you are unable to process this request in 24 hours please contact our office: [phone] I have read the above report and request that my patient continue as recommended. ? I have read the above report and request that my patient continue therapy with the following changes/special instructions:____________________________________  ? I have read the above report and request that my patient be discharged from therapy.     Physicians signature: ______________________________Date: ______Time:______

## 2020-01-03 ENCOUNTER — HOSPITAL ENCOUNTER (OUTPATIENT)
Dept: PHYSICAL THERAPY | Age: 50
Discharge: HOME | End: 2020-01-03
Payer: OTHER GOVERNMENT

## 2020-01-03 PROCEDURE — 97110 THERAPEUTIC EXERCISES: CPT

## 2020-01-03 NOTE — PROGRESS NOTES
PT DAILY TREATMENT NOTE 10-18    Patient Name: Srikanth Ruiz  Date:1/3/2020  : 1970  [x]  Patient  Verified  Payor:  / Plan: [employer] [facility] [address] AND DEPENDENTS / Product Type:  /    In time: 12:07  Out time: 12:32  Total Treatment Time (min): 25  Visit #: 1 of 8      Treatment Area: Other specified postprocedural states [Z98.890]  Pain in right shoulder [M25.511]  Complete rotator cuff tear or rupture of right shoulder, not specified as traumatic [M75.121]    SUBJECTIVE  Pain Level (0-10 scale):  0/10  Any medication changes, allergies to medications, adverse drug reactions, diagnosis change, or new procedure performed?: [x] No    [] Yes (see summary sheet for update)  Subjective functional status/changes:   [] No changes reported  Pt. Reports she is doing pretty good. She reports having an easier time reaching behind her. Pt. Reports she got pulleys for home. OBJECTIVE    25 min Therapeutic Exercise:  [x] See flow sheet :   Rationale: increase ROM and increase strength to improve the patients ability to increase ease of ADLs          With   [x] TE   [] TA   [] neuro   [] other: Patient Education: [x] Review HEP    [] Progressed/Changed HEP based on:   [] positioning   [] body mechanics   [] transfers   [] heat/ice application    [] other:      Other Objective/Functional Measures:   Mild shoulder shrug during wall wipes secondary to decreased shoulder flexibility    Pt. Continues to have firm end feel at end ranges of motion  Pt. Tolerated PT well with no reports of increased pain    Pain Level (0-10 scale) post treatment: 0/10    ASSESSMENT/Changes in Function: pt. Is progressing slowly towards goals. She continues to have decreased right shoulder flexibility but it is gradually improving. She also continues to demonstrate decreased right shoulder strength.      Patient will continue to benefit from skilled PT services to modify and progress therapeutic interventions, address functional mobility deficits, address ROM deficits, address strength deficits, analyze and address soft tissue restrictions, analyze and cue movement patterns, analyze and modify body mechanics/ergonomics and assess and modify postural abnormalities to attain remaining goals. Progress towards goals / Updated goals:  1. Patient will improve FOTO score by 3 points in order to demonstrate a significant improvement in function. 2. Patient will improve right shoulder PROM flex/abd to 135 degrees in order to increase ease of reaching into cabinets at home. 3. Patient will improve right shoulder AROM flex/abd to 120 degrees in order to increase ease of ADLs. 4. Patient will improve behind back reaching to L4 in order to increase ease of getting dressed.      PLAN  []  Upgrade activities as tolerated     [x]  Continue plan of care  []  Update interventions per flow sheet       []  Discharge due to:_  []  Other:_      Ludin Haas, LISETTE 1/3/2020  12:04 PM    Future Appointments   Date Time Provider Gilbert Cole   1/6/2020  3:30 PM [facility] [address]   1/7/2020  4:10 PM Tito Gaffney MD [facility] SCHED   1/10/2020 12:00 PM Lalo Lagunas, PT [id number] [address]   1/14/2020  9:30 AM Lalo Lagunas PT [id number] [address]   1/17/2020 10:30 AM Porter Moreau PT MMCPTPB [address]

## 2020-01-06 ENCOUNTER — HOSPITAL ENCOUNTER (OUTPATIENT)
Dept: PHYSICAL THERAPY | Age: 50
Discharge: HOME | End: 2020-01-06
Payer: OTHER GOVERNMENT

## 2020-01-06 PROCEDURE — 97110 THERAPEUTIC EXERCISES: CPT

## 2020-01-06 PROCEDURE — 97140 MANUAL THERAPY 1/> REGIONS: CPT

## 2020-01-06 NOTE — PROGRESS NOTES
PT DAILY TREATMENT NOTE 10-18    Patient Name: Aneudy Cervantes  Date:2020  : 1970  [x]  Patient  Verified  Payor:  / Plan: [facility]  ACTIVE DUTY AND DEPENDENTS / Product Type:  /    In time: 3:30  Out time: 4:02  Total Treatment Time (min): 32  Visit #: 2 of 8      Treatment Area: Other specified postprocedural states [Z98.890]  Pain in right shoulder [M25.511]  Complete rotator cuff tear or rupture of right shoulder, not specified as traumatic [M75.121]    SUBJECTIVE  Pain Level (0-10 scale):  0/10  Any medication changes, allergies to medications, adverse drug reactions, diagnosis change, or new procedure performed?: [x] No    [] Yes (see summary sheet for update)  Subjective functional status/changes:   [] No changes reported  Pt. Reports over the weekend her shoulder was a little achy over the weekend, so she didn't do much over the weekend, but its fine today. OBJECTIVE    24 min Therapeutic Exercise:  [x] See flow sheet :   Rationale: increase ROM and increase strength to improve the patients ability to increase ease of ADLs          8 min Manual Therapy:  Manual stretching in all planes, sub scap release   Rationale: increase ROM and increase tissue extensibility to improve Pt ability to reach overhead    With   [x] TE   [] TA   [] neuro   [] other: Patient Education: [x] Review HEP    [] Progressed/Changed HEP based on:   [] positioning   [] body mechanics   [] transfers   [] heat/ice application    [] other:      Other Objective/Functional Measures:   Slight inc in reps. Added standing wand ext  Pt. Tolerated PT well with no reports of increased pain    Pain Level (0-10 scale) post treatment: 0/10    ASSESSMENT/Changes in Function:  Pt required min vc with familiar exercises to perform correctly. Pt required tc to dec shoulder elevation with wall wipes, pt tolerated wand ext with no inc in pain, only reported sx were stretch feel.  Pt also tolerated manual therapy well reporting no inc in sx following tx. Pt was instructed to ice as needed at home should her shoulder become painful. Pt verbalized understanding. Patient will continue to benefit from skilled PT services to modify and progress therapeutic interventions, address functional mobility deficits, address ROM deficits, address strength deficits, analyze and address soft tissue restrictions, analyze and cue movement patterns, analyze and modify body mechanics/ergonomics and assess and modify postural abnormalities to attain remaining goals. Progress towards goals / Updated goals:  1. Patient will improve FOTO score by 3 points in order to demonstrate a significant improvement in function. 2. Patient will improve right shoulder PROM flex/abd to 135 degrees in order to increase ease of reaching into cabinets at home. 3. Patient will improve right shoulder AROM flex/abd to 120 degrees in order to increase ease of ADLs. 4. Patient will improve behind back reaching to L4 in order to increase ease of getting dressed.      PLAN  []  Upgrade activities as tolerated     [x]  Continue plan of care  []  Update interventions per flow sheet       []  Discharge due to:_  []  Other:_      Bart Bamberger 1/6/2020  12:04 PM    Future Appointments   Date Time Provider Gilbert Cole   1/6/2020  3:30 PM [facility]   1/7/2020  4:10 PM Richerd Goldmann, MD [facility]   1/10/2020 12:00 PM Derrick Pearce PT [id number] [facility]   1/14/2020  9:30 AM Derrick Pearce PT VNWOWNM [facility]   1/17/2020 10:30 AM Ashleigh Ren PT MMCPTPB [facility]

## 2020-01-10 ENCOUNTER — HOSPITAL ENCOUNTER (OUTPATIENT)
Dept: PHYSICAL THERAPY | Age: 50
Discharge: HOME | End: 2020-01-10
Payer: OTHER GOVERNMENT

## 2020-01-10 PROCEDURE — 97110 THERAPEUTIC EXERCISES: CPT

## 2020-01-10 PROCEDURE — 97124 MASSAGE THERAPY: CPT

## 2020-01-10 NOTE — PROGRESS NOTES
PT DAILY TREATMENT NOTE - [facility]     Patient Name: Yoly Aguirre  Date:1/10/2020  : 1970  [x]  Patient  Verified  Payor:  / Plan: [facility]  ACTIVE DUTY AND DEPENDENTS / Product Type:  /    In time:1201  Out time:1232  Total Treatment Time (min): 31    Visit #: 3 of 8    Treatment Area: Other specified postprocedural states [Z98.890]  Pain in right shoulder [M25.511]  Complete rotator cuff tear or rupture of right shoulder, not specified as traumatic [M75.121]    SUBJECTIVE  Pain Level (0-10 scale): 3/10  Any medication changes, allergies to medications, adverse drug reactions, diagnosis change, or new procedure performed?: [x] No    [] Yes (see summary sheet for update)  Subjective functional status/changes:   [] No changes reported  Pt reports she is hurting a little more than usual.    OBJECTIVE      23 min Therapeutic Exercise:  [] See flow sheet :   Rationale: increase ROM, increase strength and improve coordination to improve the patients ability to ease with ADL's    8 min Manual Therapy:  PROM , jt mobs to incr ROM   Rationale: decrease pain, increase ROM and increase tissue extensibility to ease with reaching       With   [] TE   [] TA   [] neuro   [] other: Patient Education: [x] Review HEP    [] Progressed/Changed HEP based on:   [] positioning   [] body mechanics   [] transfers   [] heat/ice application    [] other:      Other Objective/Functional Measures: Added towel stretch x 3 at 30 sec . Pt demo limited mobility and capsular tightness but no pain. PROM=154 deg flexion  AROM= 145 deg flex  Pain Level (0-10 scale) post treatment: 0/10    ASSESSMENT/Changes in Function: Slow progress due to restrictions to capsular tightness. She requires reminders for postural corrections.      Patient will continue to benefit from skilled PT services to modify and progress therapeutic interventions, address functional mobility deficits, address ROM deficits, address strength deficits, analyze and address soft tissue restrictions, analyze and cue movement patterns, analyze and modify body mechanics/ergonomics and assess and modify postural abnormalities to attain remaining goals. [x]  See Plan of Care  []  See progress note/recertification  []  See Discharge Summary         Progress towards goals / Updated goals:  1. Patient will improve FOTO score by 3 points in order to demonstrate a significant improvement in function. 2. Patient will improve right shoulder PROM flex/abd to 135 degrees in order to increase ease of reaching into cabinets at home. 3. Patient will improve right shoulder AROM flex/abd to 120 degrees in order to increase ease of ADLs.    4. Patient will improve behind back reaching to L4 in order to increase ease of getting dressed.     PLAN  [x]  Upgrade activities as tolerated     [x]  Continue plan of care  []  Update interventions per flow sheet       []  Discharge due to:_  []  Other:_      Luis Caballero, PT 1/10/2020  12:12 PM    Future Appointments   Date Time Provider Gilbert Kelsey   1/14/2020  9:30 AM Ildefonso Mack, PT MMCPTPB [facility]   1/17/2020 10:30 AM Ayah Oreilly, LISETTE [id number] [facility]   1/17/2020  1:10 PM Jordy Murphy MD [address]

## 2020-01-14 ENCOUNTER — HOSPITAL ENCOUNTER (OUTPATIENT)
Dept: PHYSICAL THERAPY | Age: 50
Discharge: HOME | End: 2020-01-14
Payer: OTHER GOVERNMENT

## 2020-01-14 PROCEDURE — 97140 MANUAL THERAPY 1/> REGIONS: CPT

## 2020-01-14 PROCEDURE — 97110 THERAPEUTIC EXERCISES: CPT

## 2020-01-14 NOTE — PROGRESS NOTES
PT DAILY TREATMENT NOTE 10-18    Patient Name: Chen Niño  Date:2020  : 1970  [x]  Patient  Verified  Payor:  / Plan: [facility]  ACTIVE DUTY AND DEPENDENTS / Product Type:  /    In time:930  Out time:1005  Total Treatment Time (min): 35  Visit #: 4 of 8      Treatment Area: Other specified postprocedural states [Z98.890]  Pain in right shoulder [M25.511]  Complete rotator cuff tear or rupture of right shoulder, not specified as traumatic [M75.121]    SUBJECTIVE  Pain Level (0-10 scale): 0/10  Any medication changes, allergies to medications, adverse drug reactions, diagnosis change, or new procedure performed?: [x] No    [] Yes (see summary sheet for update)  Subjective functional status/changes:   [] No changes reported  Reports she was told she is behind in progress. > 3 months post op. OBJECTIVE      27 min Therapeutic Exercise:  [] See flow sheet :   Rationale: increase ROM and increase strength to improve the patients ability to ease with ADL's    8 min Manual Therapy:  Gentle PROM,    Rationale: decrease pain, increase ROM, increase tissue extensibility and decrease trigger points to ease with ADL's            With   [] TE   [] TA   [] neuro   [] other: Patient Education: [x] Review HEP    [] Progressed/Changed HEP based on:   [] positioning   [] body mechanics   [] transfers   [] heat/ice application    [] other:      Other Objective/Functional Measures: progressed to strengthening,  band ex's. Added light resistance to most ex's, no pain reported. Pain Level (0-10 scale) post treatment: 0/10    ASSESSMENT/Changes in Function: Progressing well today no pain during  introduction of light strengthening w/o increased pain or discomfort.      Patient will continue to benefit from skilled PT services to modify and progress therapeutic interventions, address functional mobility deficits, address ROM deficits, address strength deficits, analyze and address soft tissue restrictions and analyze and cue movement patterns to attain remaining goals. [x]  See Plan of Care  []  See progress note/recertification  []  See Discharge Summary         Progress towards goals / Updated goals:  1. Patient will improve FOTO score by 3 points in order to demonstrate a significant improvement in function. 2. Patient will improve right shoulder PROM flex/abd to 135 degrees in order to increase ease of reaching into cabinets at home. 3. Patient will improve right shoulder AROM flex/abd to 120 degrees in order to increase ease of ADLs.    4. Patient will improve behind back reaching to L4 in order to increase ease of getting dressed.        PLAN  [x]  Upgrade activities as tolerated     [x]  Continue plan of care  []  Update interventions per flow sheet       []  Discharge due to:_  []  Other:_      Georgia Ventura PT 1/14/2020  9:32 AM    Future Appointments   Date Time Provider Gilbert Cole   1/17/2020 10:30 AM Zhen Morel, PT MMCPTPB [address]   1/17/2020  1:10 PM Kimani Murphy MD [address]

## 2020-01-17 ENCOUNTER — HOSPITAL ENCOUNTER (OUTPATIENT)
Dept: PHYSICAL THERAPY | Age: 50
Discharge: HOME | End: 2020-01-17
Payer: OTHER GOVERNMENT

## 2020-01-17 ENCOUNTER — OFFICE VISIT (OUTPATIENT)
Dept: ORTHOPEDIC SURGERY | Facility: CLINIC | Age: 50
End: 2020-01-17

## 2020-01-17 VITALS
OXYGEN SATURATION: 62 % | HEART RATE: 62 BPM | BODY MASS INDEX: 43.47 KG/M2 | RESPIRATION RATE: 16 BRPM | DIASTOLIC BLOOD PRESSURE: 108 MMHG | HEIGHT: 60 IN | WEIGHT: 221.4 LBS | SYSTOLIC BLOOD PRESSURE: 183 MMHG | TEMPERATURE: 98.6 F

## 2020-01-17 DIAGNOSIS — M75.121 NONTRAUMATIC COMPLETE TEAR OF RIGHT ROTATOR CUFF: Primary | ICD-10-CM

## 2020-01-17 PROCEDURE — 97110 THERAPEUTIC EXERCISES: CPT

## 2020-01-17 PROCEDURE — 97140 MANUAL THERAPY 1/> REGIONS: CPT

## 2020-01-17 NOTE — PROGRESS NOTES
1. Have you been to the ER, urgent care clinic since your last visit? Hospitalized since your last visit? No    2. Have you seen or consulted any other health care providers outside of the [address] [place] [address] since your last visit? Include any pap smears or colon screening.  No

## 2020-01-17 NOTE — PROGRESS NOTES
PT DAILY TREATMENT NOTE 10-18    Patient Name: Aneudy Cervantes  Date:2020  : 1970  [x]  Patient  Verified  Payor:  / Plan: [facility]  ACTIVE DUTY AND DEPENDENTS / Product Type:  /    In time: 10:30  Out time: 11:15  Total Treatment Time (min): 45  Visit #: 5 of 8    Treatment Area: Other specified postprocedural states [Z98.890]  Pain in right shoulder [M25.511]  Complete rotator cuff tear or rupture of right shoulder, not specified as traumatic [M75.121]    SUBJECTIVE  Pain Level (0-10 scale): 0/10  Any medication changes, allergies to medications, adverse drug reactions, diagnosis change, or new procedure performed?: [x] No    [] Yes (see summary sheet for update)  Subjective functional status/changes:   [] No changes reported  Pt. Reports she was sore earlier today but doing good now. OBJECTIVE    36 min Therapeutic Exercise:  [x] See flow sheet :   Rationale: increase ROM and increase strength to improve the patients ability to increase ease of ADLs    9 min Manual Therapy:  scap mobs, trigger point release to infraspinatus and subscap   Rationale: decrease pain, increase ROM and increase tissue extensibility to increase ease of ADLs          With   [x] TE   [] TA   [] neuro   [] other: Patient Education: [x] Review HEP    [] Progressed/Changed HEP based on:   [] positioning   [] body mechanics   [] transfers   [] heat/ice application    [] other:      Other Objective/Functional Measures:   Right shoulder PROM flex: 130 abd: 135 ER: 40 IR: 10 degrees  Right shoulder AROM flex: 110 degrees abd: 85 degrees  Pt. Was challenged with 2# resistance during side lying activities  Improved PROM following manual  Poor form with towel behind back stretch so switched to stick    Pain Level (0-10 scale) post treatment: 0/10    ASSESSMENT/Changes in Function:  Pt. Is progressing with physical therapy. She continues to have minimal pain but is having less pain overall.  She demonstrates improving right shoulder PROM and AROM mobility. She has firm end feel during PROM with mild pain. Continues to have mild shrugging with abduction. Patient will continue to benefit from skilled PT services to modify and progress therapeutic interventions, address functional mobility deficits, address ROM deficits, address strength deficits, analyze and address soft tissue restrictions, analyze and cue movement patterns, analyze and modify body mechanics/ergonomics and assess and modify postural abnormalities to attain remaining goals. Progress towards goals / Updated goals:  1. Patient will improve FOTO score by 3 points in order to demonstrate a significant improvement in function. 2. Patient will improve right shoulder PROM flex/abd to 135 degrees in order to increase ease of reaching into cabinets at home. Progressing: flex: 130 degrees abd: 135 degrees (1/17/20)  3. Patient will improve right shoulder AROM flex/abd to 120 degrees in order to increase ease of ADLs. Progressing: flex: 110 degrees abd: 85 degrees (1/17/20)  4.  Patient will improve behind back reaching to L4 in order to increase ease of getting dressed.     PLAN  []  Upgrade activities as tolerated     [x]  Continue plan of care  []  Update interventions per flow sheet       []  Discharge due to:_  []  Other:_      Wero Nguyen PT 1/17/2020  8:36 AM    Future Appointments   Date Time Provider Gilbert Kelsey   1/17/2020 10:30 AM [facility] [address]   1/17/2020  1:10 PM Rupesh Murphy MD [facility] [address]

## 2020-01-17 NOTE — PROGRESS NOTES
Patient: Brad Mccoy                MRN: [id number]       SSN: [id number]  YOB: 1970        AGE: 47 y.o. SEX: female  Body mass index is 43.24 kg/m². PCP: Liddie Dancer, PA-C  01/17/20    Chief Complaint: Right shoulder follow up    HISTORY OF PRESENT ILLNESS:  Keisha Christianson returns today for her right shoulder. She is now four months out from her right rotator cuff repair. Overall, she is doing well. She is in physical therapy. She is making improvements. No pain today. Past Medical History:   Diagnosis Date    Chest pain, unspecified 11/22/2013    resolved     Essential hypertension     Essential hypertension, benign 11/22/2013    resolved     Gout     Hypertension     Palpitations 11/22/2013    resolved     Shortness of breath 11/22/2013    resolved        Family History   Problem Relation Age of Onset    Heart Attack Maternal Grandmother     Heart Attack Maternal Grandfather        Current Outpatient Medications   Medication Sig Dispense Refill    acetaminophen-codeine (TYLENOL-CODEINE #3) 300-30 mg per tablet Take 1 Tab by mouth every four (4) hours as needed for Pain.  acetaminophen (TYLENOL EXTRA STRENGTH) 500 mg tablet Take  by mouth every six (6) hours as needed for Pain.  telmisartan-hydroCHLOROthiazide (MICARDIS HCT) 40-12.5 mg per tablet Take 1 Tab by mouth daily. 30 Tab 2    cholecalciferol, vitamin D3, (VITAMIN D3) 2,000 unit tab Take  by mouth.  multivitamin (ONE A DAY) tablet Take 1 Tab by mouth daily.          Allergies   Allergen Reactions    Vicodin [Hydrocodone-Acetaminophen] Nausea and Vomiting       Past Surgical History:   Procedure Laterality Date    HX HYSTERECTOMY      HX ORTHOPAEDIC      right knee- torn ligament       Social History     Socioeconomic History    Marital status:      Spouse name: Not on file    Number of children: Not on file    Years of education: Not on file    Highest education level: Not on file   Occupational History    Not on file   Social Needs    Financial resource strain: Not on file    Food insecurity:     Worry: Not on file     Inability: Not on file    Transportation needs:     Medical: Not on file     Non-medical: Not on file   Tobacco Use    Smoking status: Never Smoker    Smokeless tobacco: Never Used   Substance and Sexual Activity    Alcohol use: No    Drug use: Never    Sexual activity: Yes   Lifestyle    Physical activity:     Days per week: Not on file     Minutes per session: Not on file    Stress: Not on file   Relationships    Social connections:     Talks on phone: Not on file     Gets together: Not on file     Attends Mu-ism service: Not on file     Active member of club or organization: Not on file     Attends meetings of clubs or organizations: Not on file     Relationship status: Not on file    Intimate partner violence:     Fear of current or ex partner: Not on file     Emotionally abused: Not on file     Physically abused: Not on file     Forced sexual activity: Not on file   Other Topics Concern    Not on file   Social History Narrative    Not on file       REVIEW OF SYSTEMS:      No changes from previous review of systems unless noted. PHYSICAL EXAMINATION:  Visit Vitals  BP (!) 183/108   Pulse 62   Temp 98.6 °F (37 °C) (Oral)   Resp 16   Ht 5' (1.524 m)   Wt 221 lb 6.4 oz (100.4 kg)   SpO2 (!) 62%   BMI 43.24 kg/m²     Body mass index is 43.24 kg/m². GENERAL: Alert and oriented x3, in no acute distress. HEENT: Normocephalic, atraumatic. RESP: Non labored breathing. SKIN: No rashes or lesions noted. PHYSICAL EXAM:  Physical exam of the right shoulder with improving shoulder range of motion. She has forward flexion to about 140 degrees actively and passively. No pain or weakness with rotator cuff strength testing. ASSESSMENT AND PLAN:   Tree Castellano is doing well four months out from her right rotator cuff repair.   I have renewed her physical therapy today. I will plan to see her back in about six weeks.               Electronically signed by: Hetal Gentile MD

## 2020-01-28 ENCOUNTER — HOSPITAL ENCOUNTER (OUTPATIENT)
Dept: PHYSICAL THERAPY | Age: 50
Discharge: HOME | End: 2020-01-28
Payer: OTHER GOVERNMENT

## 2020-01-28 PROCEDURE — 97110 THERAPEUTIC EXERCISES: CPT

## 2020-01-28 PROCEDURE — 97161 PT EVAL LOW COMPLEX 20 MIN: CPT

## 2020-01-28 NOTE — PROGRESS NOTES
[facility] [address], [address] [place] [address], [address] [place], [place] [address] [address]  [phone] fax      Plan of Care/ Statement of Necessity for Physical Therapy Services    Patient name: Salena Solano Start of Care: 2020   Referral source: Lizbeth Smith MD : 1970    Medical Diagnosis: Pain in right shoulder [M25.511]  Payor: KRISTY / Plan: Rei Brunson 74 / Product Type: [employer] /  Onset Date: 09/23/10   Treatment Diagnosis: Right shoulder pain    Prior Hospitalization: see medical history Provider#: [id number]   Medications: Verified on Patient summary List    Comorbidities: Allergies, Arthritis, BMI over 30, HTN, Latex Allergy    Prior Level of Function: Patient is right hand dominant. Patient was going to the gym regularly prior to injury. Patient was independent with cooking and household management. The Plan of Care and following information is based on the information from the initial evaluation. Assessment/ key information: Patient presents with right shoulder pain S/P right RTC repair on 19. Patient denies any post-op complications. Patient stated she injured right arm while she was using an overhead weight machine at the gym. Patient describes right shoulder pain as intermittent aching predominately located at anterior shoulder. Patient has difficulty doing her hair, reaching overhead, and reaching arm behind her. Patient exhibits limitation into right shoulder AROM above 90 deg with UT compensatory strategies noted, decreased right shoulder strength, decreased endurance of right shoulder noted with exercises, and postural dysfunction consisting of forward head, rounded shoulders and increased kyphosis. Patient demonstrates potential to make functional gains within a reasonable time frame. Patient would benefit from skilled PT to address above deficits and assist with return to PLOF.    Evaluation Complexity History LOW Complexity : Zero comorbidities / personal factors that will impact the outcome / POC; Examination MEDIUM Complexity : 3 Standardized tests and measures addressing body structure, function, activity limitation and / or participation in recreation  ;Presentation LOW Complexity : Stable, uncomplicated  ;Clinical Decision Making MEDIUM Complexity : FOTO score of 26-74  Overall Complexity Rating: LOW   Problem List: pain affecting function, decrease ROM, decrease strength, decrease ADL/ functional abilitiies, decrease activity tolerance, decrease flexibility/ joint mobility and decrease transfer abilities   Treatment Plan may include any combination of the following: Therapeutic exercise, Therapeutic activities, Neuromuscular re-education, Physical agent/modality, Manual therapy, Patient education and Functional mobility training  Patient / Family readiness to learn indicated by: asking questions, trying to perform skills and interest  Persons(s) to be included in education: patient (P)  Barriers to Learning/Limitations: None  Patient Goal (s): I hope to accomplish the use of my right arm. Ex. Like raining my hand overhead  Patient Self Reported Health Status: fair  Rehabilitation Potential: good    Short Term Goals: To be accomplished in 3 weeks:   1. Patient will be ind and compliant with HEP 1-2x/day to increase ease with ADls. Eval:HEP established. 2. Patient will improve right shoulder AROM flex and scaption to 135deg without UT compensation to increase ease with over head activities. Eval: Right shoulder AROM flex: 117deg Scap: 116deg  Long Term Goals: To be accomplished in 6 weeks:    1. Patient will improve FOTO to at least 72 to demonstrate improved function. Eval: FOTO: 63    2. Patient will improve right shoulder flex and scap strength to 4-/5 to increase ease with household management. Eval: Not tested   3.  Patient will be able to place a 3lbs weight on a shelf overhead for 5 reps to increase right shoulder endurance and increase ease with cooking. Eval: Not tested. Frequency / Duration: Patient to be seen 2 times per week for 6 weeks. Patient/ Caregiver education and instruction: Diagnosis, prognosis, exercises   [x]  Plan of care has been reviewed with WARREN Murray, PT 1/28/2020 10:18 AM  ________________________________________________________________________    I certify that the above Therapy Services are being furnished while the patient is under my care. I agree with the treatment plan and certify that this therapy is necessary.     Physician's Signature:____________Date:_________TIME:________    [employer], Date and Time must be completed for valid certification **      Please sign and return to In [address] [place] [address], [address] [place] [address], [address] [address],[facility] 300  [phone] fax

## 2020-01-28 NOTE — PROGRESS NOTES
PT DAILY TREATMENT NOTE/SHOULDER EVAL 10-18    Patient Name: Traci Escalera  Date:2020  : 1970  [x]  Patient  Verified  Payor: KRISTY / Plan: Rei Brunson 74 / Product Type:  /    In time: 10:20  Out time:11:08  Total Treatment Time (min): 48  Visit #: 1 of 12    Treatment Area: Pain in right shoulder [M25.511]    SUBJECTIVE  Pain Level (0-10 scale): 0/10   []constant []intermittent []improving []worsening []no change since onset    Any medication changes, allergies to medications, adverse drug reactions, diagnosis change, or new procedure performed?: [x] No    [] Yes (see summary sheet for update)  Subjective functional status/changes:     PLOF: Patient is right hand dominant. Patient was going to the gym regularly prior to injury. Patient was independent with cooking and household management. Limitations to PLOF: limited mobility, weakness, pain   Mechanism of Injury: Patient presents with right shoulder pain S/P right RTC repair on 19. Patient denies any post-op complications. Patient stated she injured right arm while she was using an overhead weight machine at the gym. Patient had PT at [facility] starting in Oct 2019 and was recently discharged. Patient stated she had an issue with one of the workers there, and spoke with the clinical coordinator and decided to come to [facility] to continue with PT. Current symptoms/Complaints: Patient describes right shoulder pain as intermittent aching predominately located at anterior shoulder. Patient has difficulty doing her hair, reaching overhead, and reaching arm behind her. Previous Treatment/Compliance: Prior PT recently   PMHx/Surgical Hx: No previous neck/shoulder/back/UE surg. No pacemaker, no cardiac issues, no CA.    Work Hx: na   Living Situation:   Cognition: A & O x 2    Other:    OBJECTIVE/EXAMINATION    25 min [x]Eval                  []Re-Eval       23 min Therapeutic Exercise:  [x] See flow sheet : HEP creation and review; PROM into flexion, scap, ER and IR    Rationale: increase ROM and increase strength to improve the patients ability to perform ADls. With   [] TE   [] TA   [] neuro   [] other: Patient Education: [x] Review HEP    [] Progressed/Changed HEP based on:   [] positioning   [] body mechanics   [] transfers   [] heat/ice application    [] other:      Other Objective/Functional Measures:     Physical Therapy Evaluation - Shoulder    Posture: [] Poor    [x] Fair    [] Good    Describe: Fwd head and rounded shoulders     ROM:  [] Unable to assess at this time                                           AROM                                                              PROM   Left Right  Left Right   Flexion 157 117 Flexion  140   Extension   Extension     Scaption/ 116 Scaptin/ABD  WFL   ER @ 0 Degrees   ER @ 0 Degrees     ER @ 90 Degrees   ER @ 80 Degrees  50   IR @ 90 Degrees   IR @ 90 Degrees     IR behind back: right: Iliac crest; Left: T12     End Feel / Painful Arc:mild tightness noted at end range. Patient denied pain, just report of tightness     Strength:   [] Unable to assess at this time                                                                            L (1-5) R (1-5) Pain   Flexors 4/5 NT [] Yes   [] No   Abductors 4/5 NT [] Yes   [] No   External Rotators   [] Yes   [] No   Internal Rotators   [] Yes   [] No   Supraspinatus   [] Yes   [] No   Serratus Anterior   [] Yes   [] No   Lower Trapezius   [] Yes   [] No   Elbow Flexion   [] Yes   [] No   Elbow Extension   [] Yes   [] No       Scapulohumoral Control / Rhythm: UT compensatory strategies noted with right shoulder elevation     Other Tests / Comments:   Decreased endurance noted as right shoulder tends to fatigue with exercises. Pain Level (0-10 scale) post treatment: 0/10    ASSESSMENT/Changes in Function: See POC. Patient reported no pain at end of the session.  Therapist advised patient to perform HEP gently and to stop if pain increases. Patient will continue to benefit from skilled PT services to modify and progress therapeutic interventions, address functional mobility deficits, address ROM deficits, address strength deficits, analyze and address soft tissue restrictions, analyze and cue movement patterns and assess and modify postural abnormalities to attain remaining goals.      [x]  See Plan of Care  []  See progress note/recertification  []  See Discharge Summary         Progress towards goals / Updated goals:  [facility]    PLAN  []  Upgrade activities as tolerated     [x]  Continue plan of care  []  Update interventions per flow sheet       []  Discharge due to:_  []  Other:_      Paula Hughes, PT 1/28/2020  10:20 AM

## 2020-01-30 ENCOUNTER — HOSPITAL ENCOUNTER (OUTPATIENT)
Dept: PHYSICAL THERAPY | Age: 50
Discharge: HOME | End: 2020-01-30
Payer: OTHER GOVERNMENT

## 2020-01-30 PROCEDURE — 97530 THERAPEUTIC ACTIVITIES: CPT

## 2020-01-30 PROCEDURE — 97110 THERAPEUTIC EXERCISES: CPT

## 2020-01-30 NOTE — PROGRESS NOTES
PT DAILY TREATMENT NOTE 10-18    Patient Name: Barney Menezes  Date:2020  : 1970  [x]  Patient  Verified  Payor:  / Plan: [facility]  [facility] / Product Type:  /    In time:152  Out time:223  Total Treatment Time (min): 31  Visit #: 2 of 12    Medicare/BCBS Only   Total Timed Codes (min):  na 1:1 Treatment Time:  na       Treatment Area: Pain in right shoulder [M25.511]    SUBJECTIVE  Pain Level (0-10 scale): 0  Any medication changes, allergies to medications, adverse drug reactions, diagnosis change, or new procedure performed?: [x] No    [] Yes (see summary sheet for update)  Subjective functional status/changes:   [] No changes reported  Pt reports that she has been doing her exercises, but not every day, she has been having a little pain with wall slides and sidelying abduction, but not too bad    OBJECTIVE      15 min Therapeutic Exercise:  [x] See flow sheet :   Rationale: increase ROM and increase strength to improve the patients ability to reach and lift overhead    16 min Therapeutic Activity:  []  See flow sheet :   Rationale: increase strength and improve coordination  to improve the patients ability to perform ADLs                   With   [] TE   [] TA   [] neuro   [] other: Patient Education: [x] Review HEP    [] Progressed/Changed HEP based on:   [] positioning   [] body mechanics   [] transfers   [] heat/ice application    [] other:      Other Objective/Functional Measures: initiated therex per flowsheet     Pain Level (0-10 scale) post treatment: 0    ASSESSMENT/Changes in Function: HEP was verbally reviewed with Pt. Pt required demo and vc for all new exercises to perform correctly. She reported no inc in sx with exercises today.  Focus of tx was on improving ER, and strength through functional ROM for improved ability to perform ADLs    Patient will continue to benefit from skilled PT services to modify and progress therapeutic interventions, address functional mobility deficits, address ROM deficits, address strength deficits, analyze and address soft tissue restrictions, analyze and cue movement patterns, analyze and modify body mechanics/ergonomics, assess and modify postural abnormalities, address imbalance/dizziness and instruct in home and community integration to attain remaining goals. []  See Plan of Care  []  See progress note/recertification  []  See Discharge Summary         Progress towards goals / Updated goals:     Short Term Goals: To be accomplished in 3 weeks:               1. Patient will be ind and compliant with HEP 1-2x/day to increase ease with ADls. Eval:HEP established. 2. Patient will improve right shoulder AROM flex and scaption to 135deg without UT compensation to increase ease with over head activities. Eval: Right shoulder AROM flex: 117deg Scap: 116deg  Long Term Goals: To be accomplished in 6 weeks:                1. Patient will improve FOTO to at least 72 to demonstrate improved function. Eval: FOTO: 63                2. Patient will improve right shoulder flex and scap strength to 4-/5 to increase ease with household management. Eval: Not tested               3. Patient will be able to place a 3lbs weight on a shelf overhead for 5 reps to increase right shoulder endurance and increase ease with cooking. Eval: Not tested.      PLAN  []  Upgrade activities as tolerated     []  Continue plan of care  []  Update interventions per flow sheet       []  Discharge due to:_  []  Other:_      Jemima Bateman 1/30/2020  11:53 AM    Future Appointments   Date Time Provider Gilbert Cole   1/30/2020  2:00 PM Roxy Salinas [facility]   1/31/2020 10:30 AM [facility] [address]   2/4/2020 11:00 AM Javi Cortez PT [facility]   2/6/2020 11:30 AM Thalia Callahan [facility]   2/11/2020 11:00 AM Javi Cortez PT [facility] 2/13/2020 11:30 AM Sandra Kelley, PT [facility]   2/18/2020 11:00 AM Álvaro Mcintosh, PT [facility]   2/20/2020 11:00 AM [facility]it[facility] Dimas [facility]   2/25/2020 11:00 AM Álvaro Mcnitosh, PT [facility]   2/27/2020 11:30 AM Sandra Kelley, PT [facility]   2/28/2020  1:00 PM Jasvir Murphy MD [place] [id number]

## 2020-02-04 ENCOUNTER — HOSPITAL ENCOUNTER (OUTPATIENT)
Dept: PHYSICAL THERAPY | Age: 50
Discharge: HOME | End: 2020-02-04
Payer: OTHER GOVERNMENT

## 2020-02-04 PROCEDURE — 97112 NEUROMUSCULAR REEDUCATION: CPT

## 2020-02-04 PROCEDURE — 97110 THERAPEUTIC EXERCISES: CPT

## 2020-02-04 NOTE — PROGRESS NOTES
PT DAILY TREATMENT NOTE 10-18    Patient Name: Do Lam  Date:2020  : 1970  [x]  Patient  Verified  Payor:  / Plan: Rei Brunson 74 / Product Type:  /    In time:10:56  Out time: 11:44  Total Treatment Time (min): 48  Visit #: 3 of 12    Treatment Area: Pain in right shoulder [M25.511]    SUBJECTIVE  Pain Level (0-10 scale): 0/10  Any medication changes, allergies to medications, adverse drug reactions, diagnosis change, or new procedure performed?: [x] No    [] Yes (see summary sheet for update)  Subjective functional status/changes:   [] No changes reported  Patient stated she felt fine after doing the exercises, and stated some of them were pretty easy. Patient stated she was challenged with the ER stretch on the table.      OBJECTIVE    Modality rationale: PD   Min Type Additional Details    [] Estim:  []Unatt       []IFC  []Premod                        []Other:  []w/ice   []w/heat  Position:  Location:    [] Estim: []Att    []TENS instruct  []NMES                    []Other:  []w/US   []w/ice   []w/heat  Position:  Location:    []  Traction: [] Cervical       []Lumbar                       [] Prone          []Supine                       []Intermittent   []Continuous Lbs:  [] before manual  [] after manual    []  Ultrasound: []Continuous   [] Pulsed                           []1MHz   []3MHz W/cm2:  Location:    []  Iontophoresis with dexamethasone         Location: [] Take home patch   [] In clinic    []  Ice     []  heat  []  Ice massage  []  Laser   []  Anodyne Position:  Location:    []  Laser with stim  []  Other:  Position:  Location:    []  Vasopneumatic Device Pressure:       [] lo [] med [] hi   Temperature: [] lo [] med [] hi   [] Skin assessment post-treatment:  []intact []redness- no adverse reaction    []redness - adverse reaction:      33 min Therapeutic Exercise:  [x] See flow sheet :   Rationale: increase ROM and increase strength to improve the patients ability to perform ADls. 15 min Neuromuscular Re-education:  [x]  See flow sheet : Scapular activation to assist with facilitation of proper glenohumeral mechanics    Rationale: increase strength, improve coordination and increase proprioception  to improve the patients ability to be able to perform overhead activities safely and with proper technique. With   [] TE   [] TA   [] neuro   [] other: Patient Education: [x] Review HEP    [] Progressed/Changed HEP based on:   [] positioning   [] body mechanics   [] transfers   [] heat/ice application    [] other:      Other Objective/Functional Measures: Patient able to maintain a consistent pace with alternating on the UBE, and denied any pain while performing. Pain Level (0-10 scale) post treatment: 0/10     ASSESSMENT/Changes in Function: Therapist progressed exercises and focused on endurance of shoulder musculature to hold good positioning while performing a task. Therapist provided multiple verbal and tactile cues for scapular activation and avoidance of UT compensatory strategies. Patient was able to perform right shoulder AROM flex and scaption to approximately 100 deg without UT compensation present. Plan to progress exercises as patient is able to tolerate. Patient will continue to benefit from skilled PT services to modify and progress therapeutic interventions, address functional mobility deficits, address ROM deficits, address strength deficits, analyze and address soft tissue restrictions, analyze and cue movement patterns and assess and modify postural abnormalities to attain remaining goals. []  See Plan of Care  []  See progress note/recertification  []  See Discharge Summary         Progress towards goals / Updated goals:  Short Term Goals: To be accomplished in 3 weeks:               1. Patient will be ind and compliant with HEP 1-2x/day to increase ease with ADls.                Eval:HEP established.               2. Patient will improve right shoulder AROM flex and scaption to 135deg without UT compensation to increase ease with over head activities.                Eval: Right shoulder AROM flex: 117deg Scap: 116deg  Long Term Goals: To be accomplished in 6 weeks:                1. Patient will improve FOTO to at least 72 to demonstrate improved function.                Eval: FOTO: 63                2. Patient will improve right shoulder flex and scap strength to 4-/5 to increase ease with household management.  Polina Rear: Not tested               3. Patient will be able to place a 3lbs weight on a shelf overhead for 5 reps to increase right shoulder endurance and increase ease with cooking.                Eval: Not tested.      PLAN  []  Upgrade activities as tolerated     [x]  Continue plan of care  []  Update interventions per flow sheet       []  Discharge due to:_  []  Other:_      Rosalino Olvera, PT 2/4/2020  10:59 AM    Future Appointments   Date Time Provider Gilbert Cole   2/4/2020 11:00 AM Princess Moy, PT [facility]   2/6/2020 11:30 AM Jaylan Puentes [facility]   2/11/2020 11:00 AM Donterrance Moy, PT [facility]   2/13/2020 11:30 AM Faroese Marcella, PT [facility]   2/18/2020 11:00 AM Donterrance Moy, PT [facility]   2/20/2020 11:00 AM Becky Calderon [facility]   2/25/2020 11:00 AM Donterrance Moy, PT [facility]   2/27/2020 11:30 AM Faroese Marcella, PT [facility]   2/28/2020  1:00 PM Maria Teresa Murphy MD [facility] 68

## 2020-02-06 ENCOUNTER — APPOINTMENT (OUTPATIENT)
Dept: PHYSICAL THERAPY | Age: 50
End: 2020-02-06
Payer: OTHER GOVERNMENT

## 2020-02-11 ENCOUNTER — HOSPITAL ENCOUNTER (OUTPATIENT)
Dept: PHYSICAL THERAPY | Age: 50
Discharge: HOME | End: 2020-02-11
Payer: OTHER GOVERNMENT

## 2020-02-11 PROCEDURE — 97110 THERAPEUTIC EXERCISES: CPT

## 2020-02-11 PROCEDURE — 97112 NEUROMUSCULAR REEDUCATION: CPT

## 2020-02-11 NOTE — PROGRESS NOTES
PT DAILY TREATMENT NOTE 10-18    Patient Name: Phuong Aguilera  Date:2020  : 1970  [x]  Patient  Verified  Payor: KRISTY / Plan: Rei Brunson 74 / Product Type:  /    In time:11:07  Out time: 11:46  Total Treatment Time (min): 39  Visit #: 4 of 12      Treatment Area: Pain in right shoulder [M25.511]    SUBJECTIVE  Pain Level (0-10 scale): 0/10   Any medication changes, allergies to medications, adverse drug reactions, diagnosis change, or new procedure performed?: [x] No    [] Yes (see summary sheet for update)  Subjective functional status/changes:   [] No changes reported  Patient stated that scrubbed the floors for approximately 10 minutes and noticed increased aching in right shoulder after. Patient stated she tried putting heat on the right shoulder but that seemed to aggravate it, so she said she will do ice in the future. OBJECTIVE    29 min Therapeutic Exercise:  [x] See flow sheet :   Rationale: increase ROM and increase strength to improve the patients ability to perform ADLs safely and Efficiently. 10 min Neuromuscular Re-education:  [x]  See flow sheet : Scapular Activation to assist with facilitation of proper glenohumeral mechanics    Rationale: increase strength, improve coordination and increase proprioception  to improve the patients ability to perform overhead activities safely and with proper technique           With   [x] TE   [] TA   [] neuro   [] other: Patient Education: [x] Review HEP    [] Progressed/Changed HEP based on:   [] positioning   [] body mechanics   [] transfers   [] heat/ice application    [] other: educated patient on lack of endurance of right shoulder musculature contributing to onset of aching in right shoulder following scrubbing the floors. Advised patient to take breaks that would assist with improving endurance while avoiding increased discomfort.       Other Objective/Functional Measures: Patient did well with increased reps and resistance and was still able to maintain good form without increased pain. Right shoulder AROM: flex: 122deg Scap: 122deg   Pain Level (0-10 scale) post treatment: 0/10     ASSESSMENT/Changes in Function: Patient mentioned that the 1lbs weight for supine flexion AROM was easy last time, so increased to 2lbs, but after 5 reps patient reported discomfort in shoulder so had patient stop. Patient did a supine shoulder press at 90deg with 2lbs instead and had no pain. Plan to go back to 1 lbs for supine shoulder flexion AROM next session. Patient reported muscle fatigue with some of the exercise, but denied any pain at end of the session. Patient will continue to benefit from skilled PT services to modify and progress therapeutic interventions, address functional mobility deficits, address ROM deficits, address strength deficits, analyze and address soft tissue restrictions, analyze and cue movement patterns and assess and modify postural abnormalities to attain remaining goals. []  See Plan of Care  []  See progress note/recertification  []  See Discharge Summary         Progress towards goals / Updated goals:  Short Term Goals: To be accomplished in 3 weeks:               1. Patient will be ind and compliant with HEP 1-2x/day to increase ease with ADls.              Eval:HEP established.               2. Patient will improve right shoulder AROM flex and scaption to 135deg without UT compensation to increase ease with over head activities.                Eval: Right shoulder AROM flex: 117deg Scap: 116deg   Current: Right shoulder AROM: flex: 122deg Scap: 122deg (2/11/20)   Long Term Goals: To be accomplished in 6 weeks:                1.  Patient will improve FOTO to at least 72 to demonstrate improved function.                Eval: FOTO: 61                2. Patient will improve right shoulder flex and scap strength to 4-/5 to increase ease with household management.  Julio Conroy: Not tested               3. Patient will be able to place a 3lbs weight on a shelf overhead for 5 reps to increase right shoulder endurance and increase ease with cooking.                Eval: Not tested.     PLAN  []  Upgrade activities as tolerated     [x]  Continue plan of care  []  Update interventions per flow sheet       []  Discharge due to:_  []  Other:_      Ophelia Iniguez, PT 2/11/2020  11:08 AM    Future Appointments   Date Time Provider Gilbert Cole   2/13/2020 11:30 AM Emely Saba, PT [facility]   2/18/2020 11:00 AM Diana Lyman, PT [facility]   2/20/2020 11:00 AM Roger Tavares [facility]   2/25/2020 11:00 AM Diana Lyman, PT [facility]   2/27/2020 11:30 AM Emely Saba, PT [facility]   2/28/2020  1:00 PM Maryam Murphy MD [address]

## 2020-02-13 ENCOUNTER — HOSPITAL ENCOUNTER (OUTPATIENT)
Dept: PHYSICAL THERAPY | Age: 50
Discharge: HOME | End: 2020-02-13
Payer: OTHER GOVERNMENT

## 2020-02-13 PROCEDURE — 97530 THERAPEUTIC ACTIVITIES: CPT

## 2020-02-13 PROCEDURE — 97110 THERAPEUTIC EXERCISES: CPT

## 2020-02-13 PROCEDURE — 97112 NEUROMUSCULAR REEDUCATION: CPT

## 2020-02-13 NOTE — PROGRESS NOTES
PT DAILY TREATMENT NOTE 10-18    Patient Name: Terence Zazueta  Date:2020  : 1970  [x]  Patient  Verified  Payor:  / Plan: Rei Brunson 74 / Product Type:  /    In time:1117  Out time:1202  Total Treatment Time (min): 45  Visit #: 5 of 12      Treatment Area: Pain in right shoulder [M25.511]    SUBJECTIVE  Pain Level (0-10 scale): 0  Any medication changes, allergies to medications, adverse drug reactions, diagnosis change, or new procedure performed?: [x] No    [] Yes (see summary sheet for update)  Subjective functional status/changes:   [] No changes reported  \"I am not having any pain, it is just tight. \"    OBJECTIVE    Modality rationale:     Min Type Additional Details    [] Estim:  []Unatt       []IFC  []Premod                        []Other:  []w/ice   []w/heat  Position:  Location:    [] Estim: []Att    []TENS instruct  []NMES                    []Other:  []w/US   []w/ice   []w/heat  Position:  Location:    []  Traction: [] Cervical       []Lumbar                       [] Prone          []Supine                       []Intermittent   []Continuous Lbs:  [] before manual  [] after manual    []  Ultrasound: []Continuous   [] Pulsed                           []1MHz   []3MHz W/cm2:  Location:    []  Iontophoresis with dexamethasone         Location: [] Take home patch   [] In clinic   PD []  Ice     []  heat  []  Ice massage  []  Laser   []  Anodyne Position:  Location:    []  Laser with stim  []  Other:  Position:  Location:    []  Vasopneumatic Device Pressure:       [] lo [] med [] hi   Temperature: [] lo [] med [] hi   [] Skin assessment post-treatment:  []intact []redness- no adverse reaction    []redness - adverse reaction:          25 min Therapeutic Exercise:  [x] See flow sheet :   Rationale: increase ROM, increase strength and improve coordination to improve the patients ability to tolerate ADLS and activities    10 min Therapeutic Activity:  [x]  See flow sheet :   Rationale: increase ROM, increase strength and improve coordination  to improve the patients ability to tolerate ADLs and activities     10 min Neuromuscular Re-education:  [x]  See flow sheet :   Rationale: increase ROM, increase strength, improve coordination and increase proprioception  to improve the patients ability to tolerate ADLs and activities               With   [x] TE   [x] TA   [] neuro   [] other: Patient Education: [x] Review HEP    [] Progressed/Changed HEP based on:   [] positioning   [] body mechanics   [] transfers   [] heat/ice application    [x] other: issued green and purple bands for HEP     Other Objective/Functional Measures: VC exercises and tech  Increased bicep curls to 4# , increased TBand rows and Extensions and IR/ER  to 15X each. Horizontal abduction increased to purple Tband    Pain Level (0-10 scale) post treatment: 0    ASSESSMENT/Changes in Function: progressing well toward established goals. Patient showing increased strength and endurance with increased repetitions today and Supine horizontal abduction increased to purple. All increases were tolerated well. Patient will continue to benefit from skilled PT services to modify and progress therapeutic interventions, address ROM deficits, address strength deficits, analyze and address soft tissue restrictions, analyze and cue movement patterns, analyze and modify body mechanics/ergonomics, assess and modify postural abnormalities and instruct in home and community integration to attain remaining goals. [x]  See Plan of Care  []  See progress note/recertification  []  See Discharge Summary         Progress towards goals / Updated goals:   Short Term Goals: To be accomplished in 3 weeks:               1. Patient will be ind and compliant with HEP 1-2x/day to increase ease with ADls.              Eval:HEP established.    CURRENT reports compliance 2/13/2020               2. Patient will improve right shoulder AROM flex and scaption to 135deg without UT compensation to increase ease with over head activities.                Eval: Right shoulder AROM flex: 117deg Scap: 116deg              Current: Right shoulder AROM: flex: 122deg Scap: 122deg (2/11/20)   Long Term Goals: To be accomplished in 6 weeks:                1.  Patient will improve FOTO to at least 72 to demonstrate improved function.                Eval: FOTO: 63    CURRENT NA 2/13/2020               2. Patient will improve right shoulder flex and scap strength to 4-/5 to increase ease with household management.  Emely Avilesiter: Not tested   CURRENT NA 2/13/2020               3. Patient will be able to place a 3lbs weight on a shelf overhead for 5 reps to increase right shoulder endurance and increase ease with cooking.                Eval: tolerated 3 cones 5 sets up and down right UE overhead reach to shelf 2/13/2020    PLAN  [x]  Upgrade activities as tolerated     [x]  Continue plan of care  []  Update interventions per flow sheet       []  Discharge due to:_  []  Other:_      Martha Vergara, PT 2/13/2020  11:14 AM    Future Appointments   Date Time Provider Gilbert Lemoni   2/13/2020 11:30 AM Juanita Del Angel PT [facility]   2/18/2020 11:00 AM Niki Rice, PT [facility]   2/20/2020 11:00 AM Ac Gunn [facility]   2/25/2020 11:00 AM Niki Rice PT [facility]   2/27/2020 11:30 AM Juanita Del Angel PT [facility]   2/28/2020  1:00 PM Lev Murphy MD [address]

## 2020-02-18 ENCOUNTER — HOSPITAL ENCOUNTER (OUTPATIENT)
Dept: PHYSICAL THERAPY | Age: 50
Discharge: HOME | End: 2020-02-18
Payer: OTHER GOVERNMENT

## 2020-02-18 PROCEDURE — 97110 THERAPEUTIC EXERCISES: CPT

## 2020-02-18 PROCEDURE — 97112 NEUROMUSCULAR REEDUCATION: CPT

## 2020-02-18 PROCEDURE — 97530 THERAPEUTIC ACTIVITIES: CPT

## 2020-02-18 NOTE — PROGRESS NOTES
PT DAILY TREATMENT NOTE 10-18    Patient Name: Damien Tillman  Date:2020  : 1970  [x]  Patient  Verified  Payor:  / Plan: [facility] / Product Type:  /    In time:10:50  Out time:11:50  Total Treatment Time (min): 60  Visit #: 6 of 12    Treatment Area: Pain in right shoulder [M25.511]    SUBJECTIVE  Pain Level (0-10 scale): 0/10- sore  Any medication changes, allergies to medications, adverse drug reactions, diagnosis change, or new procedure performed?: [x] No    [] Yes (see summary sheet for update)  Subjective functional status/changes:   [] No changes reported  Patient stated that she has been doing the exercises at home with the band and noticed increased muscle soreness. OBJECTIVE    Modality rationale: decrease edema, decrease inflammation and decrease pain to improve the patients ability to perform ADLs.     Min Type Additional Details    [] Estim:  []Unatt       []IFC  []Premod                        []Other:  []w/ice   []w/heat  Position:  Location:    [] Estim: []Att    []TENS instruct  []NMES                    []Other:  []w/US   []w/ice   []w/heat  Position:  Location:    []  Traction: [] Cervical       []Lumbar                       [] Prone          []Supine                       []Intermittent   []Continuous Lbs:  [] before manual  [] after manual    []  Ultrasound: []Continuous   [] Pulsed                           []1MHz   []3MHz W/cm2:  Location:    []  Iontophoresis with dexamethasone         Location: [] Take home patch   [] In clinic   10 [x]  Ice     []  heat  []  Ice massage  []  Laser   []  Anodyne Position: seated  Location: right shoulder     []  Laser with stim  []  Other:  Position:  Location:    []  Vasopneumatic Device Pressure:       [] lo [] med [] hi   Temperature: [] lo [] med [] hi   [] Skin assessment post-treatment:  []intact []redness- no adverse reaction    []redness - adverse reaction:     28 min Therapeutic Exercise:  [x] See flow sheet :   Rationale: increase ROM and increase strength to improve the patients ability to perform ADls. 8 min Therapeutic Activity:  [x]  See flow sheet :   Rationale: increase ROM, increase strength and improve coordination  to improve the patients ability to perform household and community tasks with greater ease and efficiency. 14 min Neuromuscular Re-education:  [x]  See flow sheet : scapular activation to assist with facilitation of proper glenohumeral mechanics. Rationale: increase strength, improve coordination and increase proprioception  to improve the patients ability to perform overhead activities safely and efficiently. With   [] TE   [] TA   [] neuro   [] other: Patient Education: [x] Review HEP    [] Progressed/Changed HEP based on:   [] positioning   [] body mechanics   [] transfers   [] heat/ice application    [] other:      Other Objective/Functional Measures:    Patient was able to perform 10 reps on a shelf overhead while holding a 1lbs weight. Pain Level (0-10 scale) post treatment: 0/10    ASSESSMENT/Changes in Function: Patient challenged with active shoulder flexion, added lat stretch on table to assist with reducing tightness and improving shoulder flexion. Patient required verbal and tactile cues for correct muscle activation and mechanics with exercises. Added supine SA punch at 120deg to assist with activation of lower fibers of Serratus Anterior to improve scapular mechanics. Patient reported muscle soreness at scapular musculature and at right shoulder, but no pain at end of the session.      Patient will continue to benefit from skilled PT services to modify and progress therapeutic interventions, address functional mobility deficits, address ROM deficits, address strength deficits, analyze and address soft tissue restrictions, analyze and cue movement patterns, assess and modify postural abnormalities and address imbalance/dizziness to attain remaining goals. []  See Plan of Care  []  See progress note/recertification  []  See Discharge Summary         Progress towards goals / Updated goals:   Short Term Goals: To be accomplished in 3 weeks:               1. Patient will be ind and compliant with HEP 1-2x/day to increase ease with ADls.              Eval:HEP established. CURRENT reports compliance 2/13/2020               2. Patient will improve right shoulder AROM flex and scaption to 135deg without UT compensation to increase ease with over head activities.                Eval: Right shoulder AROM flex: 117deg Scap: 116deg              YAELJGD: Right shoulder AROM: flex: 122deg Scap: 122deg (2/11/20)   Long Term Goals: To be accomplished in 6 weeks:                1.  Patient will improve FOTO to at least 72 to demonstrate improved function.                Eval: FOTO: 63               CURRENT NA 2/13/2020               2. Patient will improve right shoulder flex and scap strength to 4-/5 to increase ease with household management.  Deysi Gil: Not tested              CURRENT NA 2/13/2020               3. Patient will be able to place a 3lbs weight on a shelf overhead for 5 reps to increase right shoulder endurance and increase ease with cooking.                Eval: tolerated 3 cones 5 sets up and down right UE overhead reach to shelf 2/13/2020; Patient was able to perform 10 reps on a shelf overhead while holding a 1lbs weight. (2/18/20)      PLAN  []  Upgrade activities as tolerated     [x]  Continue plan of care  []  Update interventions per flow sheet       []  Discharge due to:_  []  Other:_      Stephany Dillon, PT 2/18/2020  11:03 AM    Future Appointments   Date Time Provider Gilbert Cole   2/20/2020 11:00 AM Adolfo Lin [facility]   2/25/2020 11:00 AM Mabel Cruz, PT [facility]   2/27/2020 11:30 AM Orquidea De Paz, PT [facility]   2/28/2020  1:00 PM MD Pepe Francois

## 2020-02-20 ENCOUNTER — APPOINTMENT (OUTPATIENT)
Dept: PHYSICAL THERAPY | Age: 50
End: 2020-02-20
Payer: OTHER GOVERNMENT

## 2020-02-21 NOTE — PROGRESS NOTES
[facility] - [address]  [phone] fax    Physical Therapy Discharge Summary    Patient name: Michelle Evelia Merlin Start of Care: 2019   Referral source: Peterson Cartwright PA : [id number]                Medical Diagnosis: Complete rotator cuff tear or rupture of right shoulder, not specified as traumatic [M75.121]  Impingement syndrome of right shoulder [M75.41]  Other specified postprocedural states [Z98.890]  Payor:  / Plan: Tomasa Arredondo DEPENDENTS / Product Type:  /  Onset Date:2019                Treatment Diagnosis: right shoulder pain    Prior Hospitalization: see medical history Provider#: [id number]   Medications: Verified on Patient summary List    Comorbidities: high blood pressure, arthritis, latex allergy    Prior Level of Function: functionally independent, right handed     Visits from Start of Care:  12    Missed Visits: 0    Reporting Period :  19 to 20    Summary of Care:  Goal: Patient will improve FOTO score by 3 points in order to demonstrate a significant improvement in function. Status at last note/certification: 63  Status at discharge: not met    Goal: Patient will improve right shoulder PROM flex/abd to 135 degrees in order to increase ease of reaching into cabinets at home. Status at last note/certification: flex: 110 degrees scap: 111 degrees  Status at discharge: not met    Goal: Patient will improve right shoulder AROM flex/abd to 120 degrees in order to increase ease of ADLs. Status at last note/certification: unable   Status at discharge: not met    Goal: Patient will improve behind back reaching to L4 in order to increase ease of getting dressed. Status at last note/certification: unable   Status at discharge: not met    Pt. Was progressing well with physical therapy and then did not return to PT.  Goals were unable to be re-assessed secondary to unplanned D/C. Right shoulder PROM has improved flex: 130 degrees abd: 135 degrees, ER: 40 degrees, and IR: 10 degrees. Right shoulder AROM is also improving flex: 110 degrees, abd: 85 degrees. She was educated on a HEP.        ASSESSMENT/RECOMMENDATIONS:  [x]Discontinue therapy: []Patient has reached or is progressing toward set goals      [x]Patient is non-compliant or has abdicated      []Due to lack of appreciable progress towards set goals    Guillermina Ogden, PT 2/21/2020 2:16 PM

## 2020-02-25 ENCOUNTER — HOSPITAL ENCOUNTER (OUTPATIENT)
Dept: PHYSICAL THERAPY | Age: 50
Discharge: HOME | End: 2020-02-25
Payer: OTHER GOVERNMENT

## 2020-02-25 PROCEDURE — 97110 THERAPEUTIC EXERCISES: CPT

## 2020-02-25 PROCEDURE — 97112 NEUROMUSCULAR REEDUCATION: CPT

## 2020-02-25 PROCEDURE — 97530 THERAPEUTIC ACTIVITIES: CPT

## 2020-02-25 NOTE — PROGRESS NOTES
PT DAILY TREATMENT NOTE 10-18    Patient Name: Tiffani Pearson  Date:2020  : 1970  [x]  Patient  Verified  Payor:  / Plan: [facility]  [facility] REGION / Product Type:  /    In time:10:56  Out time:11:43  Total Treatment Time (min): 52  Visit #: 7 of 12    Treatment Area: Pain in right shoulder [M25.511]    SUBJECTIVE  Pain Level (0-10 scale): 0/10   Any medication changes, allergies to medications, adverse drug reactions, diagnosis change, or new procedure performed?: [x] No    [] Yes (see summary sheet for update)  Subjective functional status/changes:   [] No changes reported  Patient stated her right shoulder tightens up sometimes. OBJECTIVE    29 min Therapeutic Exercise:  [x] See flow sheet :   Rationale: increase ROM and increase strength to improve the patients ability to perform ADLs safely and efficiently     8 min Therapeutic Activity:  [x]  See flow sheet :   Rationale: increase strength and improve coordination  to improve the patients ability to perform household and community tasks with greater ease and efficiency      10 min Neuromuscular Re-education:  [x]  See flow sheet : scapular activation to assist with facilitation of proper glenohumeral mechanics    Rationale: increase strength, improve coordination and increase proprioception  to improve the patients ability to perform overhead activities safely and efficiently. With   [] TE   [] TA   [] neuro   [] other: Patient Education: [x] Review HEP    [] Progressed/Changed HEP based on:   [] positioning   [] body mechanics   [] transfers   [] heat/ice application    [] other:      Other Objective/Functional Measures: right shoulder strength: flex:4-/5  Scap:3+/5 No pain reported with muscle testing       Pain Level (0-10 scale) post treatment: 0/10     ASSESSMENT/Changes in Function: Patient progressing with left shoulder strength with objective measures testing.  Therapist was able to increase resistance or reps and patient was able to perform with good technique with verbal cues from therapist.     Patient will continue to benefit from skilled PT services to modify and progress therapeutic interventions, address functional mobility deficits, address ROM deficits, address strength deficits, analyze and address soft tissue restrictions, analyze and cue movement patterns and assess and modify postural abnormalities to attain remaining goals. []  See Plan of Care  []  See progress note/recertification  []  See Discharge Summary         Progress towards goals / Updated goals:  Short Term Goals: To be accomplished in 3 weeks:               1. Patient will be ind and compliant with HEP 1-2x/day to increase ease with ADls.              Eval:HEP established.              [facility] reports compliance 2/13/2020               2. Patient will improve right shoulder AROM flex and scaption to 135deg without UT compensation to increase ease with over head activities.                Eval: Right shoulder AROM flex: 117deg Scap: 116deg              [id number]: Right shoulder AROM: flex: 122deg Scap: 122deg (2/11/20)   Long Term Goals: To be accomplished in 6 weeks:                1. Patient will improve FOTO to at least 72 to demonstrate improved function.                Eval: FOTO: 63               CURRENT NA 2/13/2020               2. Patient will improve right shoulder flex and scap strength to 4-/5 to increase ease with household management.  Sanford Mtz: Not tested              CURRENT: right shoulder strength: flex:4-/5  Scap:3+/5 No pain reported with muscle testing (2/25/20)                3. Patient will be able to place a 3lbs weight on a shelf overhead for 5 reps to increase right shoulder endurance and increase ease with cooking.                Eval: tolerated 3 cones 5 sets up and down right UE overhead reach to shelf 2/13/2020; Patient was able to perform 10 reps on a shelf overhead while holding a 1lbs weight. (2/18/20)      PLAN  []  Upgrade activities as tolerated     [x]  Continue plan of care  []  Update interventions per flow sheet       []  Discharge due to:_  []  Other:_      Addison De Leon, PT 2/25/2020  11:05 AM    Future Appointments   Date Time Provider Gilbert Cole   2/27/2020 11:30 AM [facility] [address]   2/28/2020  1:00 PM Jillian Murphy MD [facility] 68

## 2020-02-27 ENCOUNTER — HOSPITAL ENCOUNTER (OUTPATIENT)
Dept: PHYSICAL THERAPY | Age: 50
Discharge: HOME | End: 2020-02-27
Payer: OTHER GOVERNMENT

## 2020-02-27 PROCEDURE — 97112 NEUROMUSCULAR REEDUCATION: CPT

## 2020-02-27 PROCEDURE — 97110 THERAPEUTIC EXERCISES: CPT

## 2020-02-27 PROCEDURE — 97530 THERAPEUTIC ACTIVITIES: CPT

## 2020-02-27 NOTE — PROGRESS NOTES
PT DAILY TREATMENT NOTE 10-18    Patient Name: Phuong Aguilera  Date:2020  : 1970  [x]  Patient  Verified  Payor:  / Plan: [facility]  [facility] REGION / Product Type:  /    In time:1120  Out time:1220  Total Treatment Time (min): 60  Visit #: 1 of 6       Treatment Area: Pain in right shoulder [M25.511]    SUBJECTIVE  Pain Level (0-10 scale): 0  Any medication changes, allergies to medications, adverse drug reactions, diagnosis change, or new procedure performed?: [x] No    [] Yes (see summary sheet for update)  Subjective functional status/changes:   [] No changes reported  \"I see the doctor tomorrow. \"    OBJECTIVE            30 min Therapeutic Exercise:  [x] See flow sheet :   Rationale: increase ROM, increase strength and improve coordination to improve the patients ability to tolerate ADLS and activities    15 min Therapeutic Activity:  [x]  See flow sheet :   Rationale: increase ROM, increase strength, improve coordination and improve balance  to improve the patients ability to tolerate ADLs and activities     15 min Neuromuscular Re-education:  [x]  See flow sheet :   Rationale: increase ROM, increase strength and improve coordination  to improve the patients ability to tolerate ADLs and activities             With   [x] TE   [x] TA   [] neuro   [] other: Patient Education: [x] Review HEP    [x] Progressed/Changed HEP based on:   [] positioning   [] body mechanics   [] transfers   [] heat/ice application    [x] other: POC, FOTO      Other Objective/Functional Measures: VC exercises and technique, increased reps for overhead shelf reach, Tband rows and extensions, supine shoulder flexion     Right shoulder AROM: flex: 130deg Scap:120 deg    Pain Level (0-10 scale) post treatment: 0    ASSESSMENT/Changes in Function: tolerated well - improving FOTO, compliant with HEP, residual decreased strength.      Patient will continue to benefit from skilled PT services to modify and progress therapeutic interventions, address ROM deficits, address strength deficits, analyze and address soft tissue restrictions, analyze and cue movement patterns, analyze and modify body mechanics/ergonomics, assess and modify postural abnormalities and instruct in home and community integration to attain remaining goals. [x]  See Plan of Care  [x]  See progress note/recertification  []  See Discharge Summary         Progress towards EVALUATION GOALS-       New goals on today's MD note     Short Term Goals: To be accomplished in 3 weeks:               1. Patient will be ind and compliant with HEP 1-2x/day to increase ease with ADls.              Eval:HEP established.              CURRENT Compliant 2/27/2020               2. Patient will improve right shoulder AROM flex and scaption to 135deg without UT compensation to increase ease with over head activities.                Eval: Right shoulder AROM flex: 117deg Scap: 116deg              ZVRRLDA: Right shoulder AROM: flex: 130deg Scap:120 deg (2/27/20)   Long Term Goals: To be accomplished in 6 weeks:                1.  Patient will improve FOTO to at least 72 to demonstrate improved function.                Eval: FOTO: 63               CURRENT 68 2/27/2020               2. Patient will improve right shoulder flex and scap strength to 4-/5 to increase ease with household management.  Charla Reins: Not tested              CURRENT: right shoulder strength: flex:4-/5  Scap:3+/5 No pain reported with muscle testing (2/25/20)                3. Patient will be able to place a 3lbs weight on a shelf overhead for 5 reps to increase right shoulder endurance and increase ease with cooking.                Eval: tolerated 3 cones 5 sets up and down right UE overhead reach to shelf 2/13/2020; Patient was able to perform 10 reps on a shelf overhead while holding a 1lbs weight. (2/18/20)    CURRENT 2# overhead 15X 2/27/2020    PLAN  [x]  Upgrade activities as tolerated     [x] Continue plan of care  []  Update interventions per flow sheet       []  Discharge due to:_  [x]  Other:_  Send 30 day MD note, MD F/U tomorrow    Geraldine Kirk PT 2/27/2020  11:43 AM    Future Appointments   Date Time Provider Gilbert Cole   2/28/2020  1:00 PM MD Matheus Thompson 68

## 2020-02-27 NOTE — PROGRESS NOTES
[facility] [address] [facility]  [facility], [address] [place] [address], [facility] [address] [phone] fax    Physician Update  [x] Progress Note  [] Discharge Summary  Patient name: Alexa Unger Start of Care: 2020   Referral source: Noe Jacinto MD : 1970   Medical/Treatment Diagnosis: Pain in right shoulder [M25.511]  Payor:  / Plan: Rei Brunson 74 / Product Type: [place] /  Onset Date:19     Prior Hospitalization: see medical history Provider#: [id number]   Medications: Verified on Patient Summary List     Comorbidities: Allergies, Arthritis, BMI over 30, HTN, Latex Allergy    Prior Level of Function: Patient is right hand dominant. Patient was going to the gym regularly prior to injury. Patient was independent with cooking and household management.                    Visits from Start of Care: 7      Missed Visits: 2    Status at Evaluation/Last Progress Note: initial evaluation and POC  Progress towards Goals: FOTO improved to 68, Pain 0, She reports HEP compliance, Right shoulder AROM: flex: 130deg Scap:120 deg. Patient demonstrates the potential to make further gains with improving ROM, strength, endurance/activity tolerance, functional FOTO survey score   and all within a reasonable time frame so as to further increase their functional independence with ADLs and activities for carryover to  Improved quality of life, tolerance to household and community activities. Patient requires skilled Physical Therapy so as to monitor their response to and modify their treatment plan accordingly. Patient appears to be an appropriate candidate for skilled outpatient Physical Therapy.        Goals: to be achieved in 6 treatments:             1.  Patient will improve FOTO to at least 72 to demonstrate improved function.              PN   68 2020               2. Patient will improve right shoulder flex and scap strength to 4,4+/5 to increase ease with household management.              NC : right shoulder strength: flex:4-/5  Scap:3+/5 No pain reported with muscle testing (2/25/20)                3. Patient will be able to place a 3lbs weight on a shelf overhead for 5 reps to increase right shoulder endurance and increase ease with cooking.             PN 2# overhead 15X 2/27/2020    ASSESSMENT/RECOMMENDATIONS:  [x]Continue therapy per initial plan/protocol at a frequency of  2-3 x per week for 6 treatments  []Continue therapy with the following recommended changes:_____________________      _____________________________________________________________________  []Discontinue therapy progressing towards or have reached established goals  []Discontinue therapy due to lack of appreciable progress towards goals  []Discontinue therapy due to lack of attendance or compliance  []Await Physician's recommendations/decisions regarding therapy  []Other:________________________________________________________________    Thank you for this referral. Cary Chisholm, PT 2/27/2020 11:43 AM  NOTE TO PHYSICIAN:  PLEASE COMPLETE THE ORDERS BELOW AND   FAX TO [facility]: [phone]  If you are unable to process this request in 24 hours please contact our office: [phone]    ? I have read the above report and request that my patient continue as recommended. ? I have read the above report and request that my patient continue therapy with the following changes/special instructions:_____________________________________  ? I have read the above report and request that my patient be discharged from therapy.     [de-identified] Signature:____________Date:_________TIME:________    [facility], Date and Time must be completed for valid certification **

## 2020-02-28 ENCOUNTER — OFFICE VISIT (OUTPATIENT)
Dept: ORTHOPEDIC SURGERY | Facility: CLINIC | Age: 50
End: 2020-02-28

## 2020-02-28 VITALS
HEIGHT: 60 IN | OXYGEN SATURATION: 92 % | TEMPERATURE: 97.8 F | WEIGHT: 218.2 LBS | DIASTOLIC BLOOD PRESSURE: 91 MMHG | HEART RATE: 117 BPM | BODY MASS INDEX: 42.84 KG/M2 | SYSTOLIC BLOOD PRESSURE: 139 MMHG | RESPIRATION RATE: 18 BRPM

## 2020-02-28 DIAGNOSIS — M75.121 NONTRAUMATIC COMPLETE TEAR OF RIGHT ROTATOR CUFF: Primary | ICD-10-CM

## 2020-02-28 RX ORDER — AMLODIPINE BESYLATE 5 MG/1
5 TABLET ORAL DAILY
COMMUNITY
End: 2020-07-10

## 2020-02-28 NOTE — LETTER
NOTIFICATION RETURN TO WORK / SCHOOL 
 
2/28/2020 1:08 PM 
 
Ms. Aneudy Cervantes [address] [address] To Whom It May Concern: 
 
Aneudy Cervantes is currently under the care of [address] [place] [address]. She will return to work with the following restrictions: 
 
No lifting greater than 20 pounds with the right arm for the next 3 months. If there are questions or concerns please have the patient contact our office.  
 
 
 
Sincerely, 
 
 
Beryle Primmer, MD

## 2020-02-28 NOTE — PROGRESS NOTES
Patient: Jayla Andujar                MRN: [id number]       SSN: [id number]  YOB: 1970        AGE: 47 y.o. SEX: female  Body mass index is 42.61 kg/m². PCP: Maximiliano Prajapati PA-C  02/28/20    Chief Complaint: Right shoulder follow up     HISTORY OF PRESENT ILLNESS:  Pedro Santoyo returns to the office today for her right shoulder. She is now almost six months out from her rotator cuff repair. She has been in physical therapy. She has no pain. She still does report some mild weakness. Past Medical History:   Diagnosis Date    Chest pain, unspecified 11/22/2013    resolved     Essential hypertension     Essential hypertension, benign 11/22/2013    resolved     Gout     Hypertension     Palpitations 11/22/2013    resolved     Shortness of breath 11/22/2013    resolved        Family History   Problem Relation Age of Onset    Heart Attack Maternal Grandmother     Heart Attack Maternal Grandfather        Current Outpatient Medications   Medication Sig Dispense Refill    amLODIPine (NORVASC) 5 mg tablet Take 5 mg by mouth daily.  acetaminophen-codeine (TYLENOL-CODEINE #3) 300-30 mg per tablet Take 1 Tab by mouth every four (4) hours as needed for Pain.  acetaminophen (TYLENOL EXTRA STRENGTH) 500 mg tablet Take  by mouth every six (6) hours as needed for Pain.  telmisartan-hydroCHLOROthiazide (MICARDIS HCT) 40-12.5 mg per tablet Take 1 Tab by mouth daily. 30 Tab 2    cholecalciferol, vitamin D3, (VITAMIN D3) 2,000 unit tab Take  by mouth.  multivitamin (ONE A DAY) tablet Take 1 Tab by mouth daily.          Allergies   Allergen Reactions    Latex Itching    Vicodin [Hydrocodone-Acetaminophen] Nausea and Vomiting       Past Surgical History:   Procedure Laterality Date    HX HYSTERECTOMY      HX ORTHOPAEDIC      right knee- torn ligament       Social History     Socioeconomic History    Marital status:      Spouse name: Not on file    Number of children: Not on file    Years of education: Not on file    Highest education level: Not on file   Occupational History    Not on file   Social Needs    Financial resource strain: Not on file    Food insecurity:     Worry: Not on file     Inability: Not on file    Transportation needs:     Medical: Not on file     Non-medical: Not on file   Tobacco Use    Smoking status: Never Smoker    Smokeless tobacco: Never Used   Substance and Sexual Activity    Alcohol use: No    Drug use: Never    Sexual activity: Yes   Lifestyle    Physical activity:     Days per week: Not on file     Minutes per session: Not on file    Stress: Not on file   Relationships    Social connections:     Talks on phone: Not on file     Gets together: Not on file     Attends Baptism service: Not on file     Active member of club or organization: Not on file     Attends meetings of clubs or organizations: Not on file     Relationship status: Not on file    Intimate partner violence:     Fear of current or ex partner: Not on file     Emotionally abused: Not on file     Physically abused: Not on file     Forced sexual activity: Not on file   Other Topics Concern    Not on file   Social History Narrative    Not on file       REVIEW OF SYSTEMS:      No changes from previous review of systems unless noted. PHYSICAL EXAMINATION:  Visit Vitals  BP (!) 139/91   Pulse (!) 117   Temp 97.8 °F (36.6 °C) (Oral)   Resp 18   Ht 5' (1.524 m)   Wt 218 lb 3.2 oz (99 kg)   SpO2 92%   BMI 42.61 kg/m²     Body mass index is 42.61 kg/m². GENERAL: Alert and oriented x3, in no acute distress. HEENT: Normocephalic, atraumatic. RESP: Non labored breathing. SKIN: No rashes or lesions noted. PHYSICAL EXAM:  Physical exam of the right shoulder with good range of motion. Good strength without any weakness with rotator cuff strength testing.   She has some stiffness with full abduction and external rotation, as well as internal rotation up the back.      ASSESSMENT AND PLAN:   Semaj Younger is doing well recovering from the right shoulder rotator cuff repair. She is now about six months out. I will give her some restrictions for the next three months, no lifting greater than 20 pounds. I will see her back at that time and we will discuss full return to work duties.               Electronically signed by: Jess Watres MD

## 2020-02-28 NOTE — PROGRESS NOTES
1. Have you been to the ER, urgent care clinic since your last visit? Hospitalized since your last visit? No    2. Have you seen or consulted any other health care providers outside of the [address] [place] [address] since your last visit? Include any pap smears or colon screening.  No

## 2020-03-18 NOTE — PROGRESS NOTES
[facility] [address], [address] [place] [address], [address] [place] [address] [address]  [phone] fax    Discharge Summary    Patient name: Terence Zazueta Start of Care: 2020   Referral source: Robert Serrato MD : 1970   Medical/Treatment Diagnosis: Pain in right shoulder [M25.511]  Payor: KRISTY / Plan: Rei Brunson 74 / Product Type: Ran Geren /  Onset Date:19      Prior Hospitalization: see medical history Provider#: [id number]   Medications: Verified on Patient Summary List      Comorbidities: Allergies, Arthritis, BMI over 30, HTN, Latex Allergy    Prior Level of Function: Patient is right hand dominant. Patient was going to the gym regularly prior to injury. Patient was independent with cooking and household management.     Visits from Start of Care: 8    Missed Visits: 3  Reporting Period : 20 to 20      Summary of Care: Pt has completed 8 skilled PT interventions to address R shoulder pain s/p surgery. Pt called in on 3/13/20 to report that physician advised to discharge from PT. Unable to fully reassess as planned. Discharge recommendations are to complete HEP I and follow-up with physician as needed. Goal: Patient will improve FOTO to at least 72 to demonstrate improved function. Status at last note/certification: 68  Status at discharge: not met    Goal: Patient will improve right shoulder flex and scap strength to 4,4+/5 to increase ease with household management. Status at last note/certification: right shoulder strength: flex:4-/5  Scap:3+/5 No pain reported with muscle testing   Status at discharge: not met    Goal: Patient will be able to place a 3lbs weight on a shelf overhead for 5 reps to increase right shoulder endurance and increase ease with cooking.    Status at last note/certification: 2# overhead 15X   Status at discharge: not met    ASSESSMENT/RECOMMENDATIONS:  []Discontinue therapy progressing towards or have reached established goals  []Discontinue therapy due to lack of appreciable progress towards goals  []Discontinue therapy due to lack of attendance or compliance  [x]Other: D/C due to physician request per patient report    Thank you for this referral.     Lachelle Jones 3/18/2020 10:55 AM

## 2020-06-05 ENCOUNTER — OFFICE VISIT (OUTPATIENT)
Dept: ORTHOPEDIC SURGERY | Facility: CLINIC | Age: 50
End: 2020-06-05

## 2020-06-05 VITALS
DIASTOLIC BLOOD PRESSURE: 69 MMHG | HEART RATE: 99 BPM | TEMPERATURE: 97.8 F | HEIGHT: 60 IN | SYSTOLIC BLOOD PRESSURE: 136 MMHG | WEIGHT: 213 LBS | BODY MASS INDEX: 41.82 KG/M2

## 2020-06-05 DIAGNOSIS — M75.121 NONTRAUMATIC COMPLETE TEAR OF RIGHT ROTATOR CUFF: Primary | ICD-10-CM

## 2020-06-05 DIAGNOSIS — M75.102 ROTATOR CUFF SYNDROME, LEFT: ICD-10-CM

## 2020-06-05 NOTE — PROGRESS NOTES
Patient: Hetal Calderon                MRN: [id number]       SSN: [id number]  YOB: 1970        AGE: 47 y.o. SEX: female  Body mass index is 41.6 kg/m². PCP: Aj Mccoy PA-C  06/05/20    CHIEF COMPLAINT: s/p right rotator cuff tear, left shoulder pain    HPI: Hetal Calderon is a 47 y.o. female patient who returns to the office today. She is now just shy of 9 months out from her right rotator cuff repair. Overall that is doing well. She has been having some left shoulder pain for the past few months. This is similar but not as severe as the right shoulder pain she was having prior to surgery. For her right shoulder she has been doing a home exercise program and doing well. Her pain is been very minimal and her motion is good today. Her strength is also improved. The left shoulder she notes some issues with lifting at times. Past Medical History:   Diagnosis Date    Chest pain, unspecified 11/22/2013    resolved     Essential hypertension     Essential hypertension, benign 11/22/2013    resolved     Gout     Hypertension     Ovarian cyst     Palpitations 11/22/2013    resolved     Shortness of breath 11/22/2013    resolved        Family History   Problem Relation Age of Onset    Heart Attack Maternal Grandmother     Heart Attack Maternal Grandfather        Current Outpatient Medications   Medication Sig Dispense Refill    acetaminophen-codeine (TYLENOL-CODEINE #3) 300-30 mg per tablet Take 1 Tab by mouth every four (4) hours as needed for Pain.  acetaminophen (TYLENOL EXTRA STRENGTH) 500 mg tablet Take  by mouth every six (6) hours as needed for Pain.  telmisartan-hydroCHLOROthiazide (MICARDIS HCT) 40-12.5 mg per tablet Take 1 Tab by mouth daily. 30 Tab 2    cholecalciferol, vitamin D3, (VITAMIN D3) 2,000 unit tab Take  by mouth.  multivitamin (ONE A DAY) tablet Take 1 Tab by mouth daily.       amLODIPine (NORVASC) 5 mg tablet Take 5 mg by mouth daily.          Allergies   Allergen Reactions    Latex Itching    Vicodin [Hydrocodone-Acetaminophen] Nausea and Vomiting       Past Surgical History:   Procedure Laterality Date    HX HYSTERECTOMY      HX ORTHOPAEDIC      right knee- torn ligament       Social History     Socioeconomic History    Marital status:      Spouse name: Not on file    Number of children: Not on file    Years of education: Not on file    Highest education level: Not on file   Occupational History    Not on file   Social Needs    Financial resource strain: Not on file    Food insecurity     Worry: Not on file     Inability: Not on file    Transportation needs     Medical: Not on file     Non-medical: Not on file   Tobacco Use    Smoking status: Never Smoker    Smokeless tobacco: Never Used   Substance and Sexual Activity    Alcohol use: No    Drug use: Never    Sexual activity: Yes   Lifestyle    Physical activity     Days per week: Not on file     Minutes per session: Not on file    Stress: Not on file   Relationships    Social connections     Talks on phone: Not on file     Gets together: Not on file     Attends Denominational service: Not on file     Active member of club or organization: Not on file     Attends meetings of clubs or organizations: Not on file     Relationship status: Not on file    Intimate partner violence     Fear of current or ex partner: Not on file     Emotionally abused: Not on file     Physically abused: Not on file     Forced sexual activity: Not on file   Other Topics Concern    Not on file   Social History Narrative    Not on file       REVIEW OF SYSTEMS:      CON: negative for recent weight loss/gain, fever, or chills  EYE: negative for double or blurry vision  ENT: negative for hoarseness  RS:   negative for cough, URI, SOB  CV:  negative for chest pain, palpitations  GI:    negative for blood in stool, nausea/vomiting  :  negative for blood in urine  MS: As per HPI  Other systems reviewed and noted below. PHYSICAL EXAMINATION:  Visit Vitals  /69   Pulse 99   Temp 97.8 °F (36.6 °C)   Ht 5' (1.524 m)   Wt 213 lb (96.6 kg)   BMI 41.60 kg/m²     Body mass index is 41.6 kg/m². GENERAL: Alert and oriented x3, in no acute distress, well-developed, well-nourished. HEENT: Normocephalic, atraumatic. RESP: Non labored breathing with equal chest rise on inspiration. CV: Well perfused extremities. No cyanosis or clubbing noted. ABDOMEN: Soft, non-tender, non-distended. Shoulder Examination     R   L  ROM   FF  Full   Full  ER  Full   Full   IR  Full   Full  Rotator Cuff Pain   Supra  -   +   Infra  -   -   Subscap -   -  Crepitus  -   -  Effusion  -   -  Warmth  -   -   Erythema  -   -  Instability  -   -  AC Joint TTP  -   -  Clavicle   Deformity -   -   TTP  -   -  Proximal Humerus   Deformity -   -   TTP  -   -  Deltoid Strength 5   5  Biceps Strength 5   5  Biceps Deformity -   -  Biceps Groove Pain -   -  Impingement Sign -   -   Healed portal sites right shoulder    IMAGING:  No imaging today    ASSESSMENT & PLAN  Diagnosis: Status post right rotator cuff repair, left shoulder rotator cuff type pain    BODØ is doing very well recovering from her right shoulder surgery. She is just shy of 9 months. She can return to all activities as tolerated and increase her use of her right arm including lifting above head as she tolerates. For her left shoulder I think this is likely from compensation and she has some rotator cuff tendinitis. I do not recommend any advanced imaging at this time. I would like for her to do her home exercises for the left shoulder and she will come back in 6 to 8 weeks if she continues to have symptoms.     Electronically signed by: Salvador Salinas MD

## 2020-06-05 NOTE — PROGRESS NOTES
Pt reports B/L shoulder pain states PCP was supposed to send referral for Lt shoulder. Pt originally seen for Rt.

## 2020-11-08 PROBLEM — J18.9 BILATERAL PNEUMONIA: Status: ACTIVE | Noted: 2020-11-08

## 2022-01-11 PROBLEM — K43.9 VENTRAL HERNIA: Status: ACTIVE | Noted: 2022-01-11

## 2022-03-19 PROBLEM — K43.9 VENTRAL HERNIA: Status: ACTIVE | Noted: 2022-01-11

## 2022-03-19 PROBLEM — J18.9 BILATERAL PNEUMONIA: Status: ACTIVE | Noted: 2020-11-08

## 2023-08-15 ENCOUNTER — OFFICE VISIT (OUTPATIENT)
Age: 53
End: 2023-08-15
Payer: OTHER GOVERNMENT

## 2023-08-15 VITALS — BODY MASS INDEX: 40.62 KG/M2 | RESPIRATION RATE: 18 BRPM | HEIGHT: 60 IN

## 2023-08-15 DIAGNOSIS — M75.22 BICEPS TENDINITIS OF LEFT SHOULDER: Primary | ICD-10-CM

## 2023-08-15 DIAGNOSIS — G89.29 CHRONIC LEFT SHOULDER PAIN: ICD-10-CM

## 2023-08-15 DIAGNOSIS — M25.512 CHRONIC LEFT SHOULDER PAIN: ICD-10-CM

## 2023-08-15 PROCEDURE — 73030 X-RAY EXAM OF SHOULDER: CPT | Performed by: ORTHOPAEDIC SURGERY

## 2023-08-15 PROCEDURE — 99203 OFFICE O/P NEW LOW 30 MIN: CPT | Performed by: ORTHOPAEDIC SURGERY

## 2023-08-15 PROCEDURE — 20610 DRAIN/INJ JOINT/BURSA W/O US: CPT | Performed by: ORTHOPAEDIC SURGERY

## 2023-08-15 RX ORDER — TRIAMCINOLONE ACETONIDE 40 MG/ML
40 INJECTION, SUSPENSION INTRA-ARTICULAR; INTRAMUSCULAR ONCE
Status: COMPLETED | OUTPATIENT
Start: 2023-08-15 | End: 2023-08-15

## 2023-08-15 RX ADMIN — TRIAMCINOLONE ACETONIDE 40 MG: 40 INJECTION, SUSPENSION INTRA-ARTICULAR; INTRAMUSCULAR at 11:17

## 2023-09-26 ENCOUNTER — OFFICE VISIT (OUTPATIENT)
Age: 53
End: 2023-09-26
Payer: OTHER GOVERNMENT

## 2023-09-26 VITALS — RESPIRATION RATE: 18 BRPM | HEIGHT: 60 IN | BODY MASS INDEX: 40.62 KG/M2

## 2023-09-26 DIAGNOSIS — M75.22 BICEPS TENDINITIS OF LEFT SHOULDER: Primary | ICD-10-CM

## 2023-09-26 PROCEDURE — 99213 OFFICE O/P EST LOW 20 MIN: CPT | Performed by: ORTHOPAEDIC SURGERY

## 2023-10-31 ENCOUNTER — HOSPITAL ENCOUNTER (OUTPATIENT)
Facility: HOSPITAL | Age: 53
Discharge: HOME | End: 2023-11-03
Attending: ORTHOPAEDIC SURGERY
Payer: OTHER GOVERNMENT

## 2023-10-31 DIAGNOSIS — M75.22 BICEPS TENDINITIS OF LEFT SHOULDER: ICD-10-CM

## 2023-10-31 PROCEDURE — 73221 MRI JOINT UPR EXTREM W/O DYE: CPT

## 2024-09-30 NOTE — TELEPHONE ENCOUNTER
Patient is calling to have the results of her US that was conducted at [facility]. Please call once reviewed. [Father] : father

## 2025-03-12 NOTE — PROGRESS NOTES
Patient: Kelly Rodriguez                MRN: [id number]       SSN: [id number]  YOB: 1970        AGE: 54 y.o.        SEX: female      PCP: Roxann Gaona PA-C  03/13/25    Chief Complaint   Patient presents with    Left knee pain and swelling     Questions regarding bone density     HISTORY:  Kelly Rodriguez is a 54 y.o. female seen for 6 month history of left knee pain.  She felt her left knee give way when she stepped down wrong from a ladder a few months ago and has been experiencing worsening left knee pain and swelling since. She was seen at [facility] on 1/5/25 where x-rays were taken and she was provided an Rx for Voltaren gel. She feels pain with standing, walking and stair climbing.  She experiences startup pain after sitting. She is concerned she may have osteoporosis.  She has a history of stage III chronic kidney disease and her nephrologist recommended a bone density study.    She was previously seen for right shoulder pain. She was previously seen by Dr. Palacios for left shoulder pain.     Occupation, etc: Ms. Rodriguez is currently unemployed. 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   She had stopped working after injuring her right knee in a work related injury. She  would like to start working again soon. She lives in [place] with her  and 18 yo daughter. She has another 20 yo daughter studying physical therapy at [employer]. Her 18 yo daughter will be a senior in high school. Current weight is 208 pounds.   She is 5' tall.  She has a h/o stage 3 kidney disease--still making urine.   Wt Readings from Last 3 Encounters:   03/13/25 93 kg (205 lb)      Body mass index is 40.04 kg/m².    Patient Active Problem List   Diagnosis    Essential hypertension, benign    Palpitations    Ventral hernia    Shortness of breath    Chest pain, unspecified    Bilateral pneumonia       Social History     Tobacco Use    Smoking status: Never

## 2025-03-13 ENCOUNTER — OFFICE VISIT (OUTPATIENT)
Age: 55
End: 2025-03-13

## 2025-03-13 VITALS — WEIGHT: 205 LBS | HEIGHT: 60 IN | BODY MASS INDEX: 40.25 KG/M2

## 2025-03-13 DIAGNOSIS — M17.12 UNILATERAL PRIMARY OSTEOARTHRITIS, LEFT KNEE: ICD-10-CM

## 2025-03-13 DIAGNOSIS — M17.11 UNILATERAL PRIMARY OSTEOARTHRITIS, RIGHT KNEE: ICD-10-CM

## 2025-03-13 DIAGNOSIS — M25.562 CHRONIC PAIN OF LEFT KNEE: Primary | ICD-10-CM

## 2025-03-13 DIAGNOSIS — M85.80 OSTEOPENIA, UNSPECIFIED LOCATION: ICD-10-CM

## 2025-03-13 DIAGNOSIS — G89.29 CHRONIC PAIN OF LEFT KNEE: Primary | ICD-10-CM

## (undated) DEVICE — SOLUTION IRRIG 3000ML 0.9% SOD CHL FLX CONT 0797208] ICU MEDICAL INC]

## (undated) DEVICE — BANDAGE,GAUZE,BULKEE II,4.5"X4.1YD,STRL: Brand: MEDLINE

## (undated) DEVICE — Device

## (undated) DEVICE — DRAPE,REIN 53X77,STERILE: Brand: MEDLINE

## (undated) DEVICE — BLADE RMFG SHVR 4.0MM TOMCAT --

## (undated) DEVICE — 4-PORT MANIFOLD: Brand: NEPTUNE 2

## (undated) DEVICE — STERILE POLYISOPRENE POWDER-FREE SURGICAL GLOVES: Brand: PROTEXIS

## (undated) DEVICE — T-MAX DISPOSABLE FACE MASK 8 PER BOX

## (undated) DEVICE — TAPE,MEDFIX EZ,SELF WOUND,2"X11YD: Brand: MEDLINE

## (undated) DEVICE — (D)PREP SKN CHLRAPRP APPL 26ML -- CONVERT TO ITEM 371833

## (undated) DEVICE — NEEDLE NRV BLK 21GA L4IN 30DEG INSUL BVL EXTN SET STIMUPLEX

## (undated) DEVICE — SHOULDER STABILIZATION KIT,                                    DISPOSABLE 12 PER BOX

## (undated) DEVICE — STOCKINET,IMPERVIOUS,6X30: Brand: MEDLINE

## (undated) DEVICE — KIT CLN UP BON SECOURS MARYV

## (undated) DEVICE — DRSG MEPILEX AG 4X4IN -- CONVERT TO ITEM 365990

## (undated) DEVICE — CANNULA THREADED FLEX 8.0 X 72MM: Brand: CLEAR-TRAC

## (undated) DEVICE — BUR SHV CUT HLLW 6 FLUT 5.5MM --

## (undated) DEVICE — GARMENT,MEDLINE,DVT,SEQUENTIAL,CALF,MD: Brand: MEDLINE

## (undated) DEVICE — [ARTHROSCOPY PUMP,  DO NOT USE IF PACKAGE IS DAMAGED,  KEEP DRY,  KEEP AWAY FROM SUNLIGHT,  PROTECT FROM HEAT AND RADIOACTIVE SOURCES.]: Brand: FLOSTEADY

## (undated) DEVICE — NEEDLE SUT PASS FOR ROT CUF LABRAL REP MULTFI SCORPION

## (undated) DEVICE — PAD,ABDOMINAL,8"X10",ST,LF: Brand: MEDLINE

## (undated) DEVICE — 3M™ COBAN™ SELF-ADHERENT WRAP, 1586S, STERILE, 6 IN X 5 YD (15 CM X 4,5 M), 12 ROLLS/CASE: Brand: 3M™ COBAN™

## (undated) DEVICE — DISPOSABLE MULTI BAG ADAPTERS Y                                    TUBING, STERILE, 2 TO A SET 6 SETS                                    PER BOX

## (undated) DEVICE — 3M™ STERI-DRAPE™ U-DRAPE 1015: Brand: STERI-DRAPE™

## (undated) DEVICE — 90-S MAX, SUCTION PROBE, NON-BENDABLE, MAX CUT LEVEL 11: Brand: SERFAS ENERGY

## (undated) DEVICE — GAUZE,SPONGE,4"X4",16PLY,STRL,LF,10/TRAY: Brand: MEDLINE

## (undated) DEVICE — SUT PDS II 0 18IN CT1 VIO --

## (undated) DEVICE — 3M™ WARMING BLANKET, LOWER BODY, 10 PER CASE, 42568: Brand: BAIR HUGGER™

## (undated) DEVICE — SUTURE ETHLN SZ 2-0 L18IN NONABSORBABLE BLK L19MM PS-2 PRIM 593H

## (undated) DEVICE — DRESSING,GAUZE,XEROFORM,CURAD,1"X8",ST: Brand: CURAD

## (undated) DEVICE — 1010 S-DRAPE TOWEL DRAPE 10/BX: Brand: STERI-DRAPE™